# Patient Record
Sex: MALE | Race: OTHER | HISPANIC OR LATINO
[De-identification: names, ages, dates, MRNs, and addresses within clinical notes are randomized per-mention and may not be internally consistent; named-entity substitution may affect disease eponyms.]

---

## 2018-03-27 ENCOUNTER — RX RENEWAL (OUTPATIENT)
Age: 83
End: 2018-03-27

## 2018-08-28 ENCOUNTER — INPATIENT (INPATIENT)
Facility: HOSPITAL | Age: 83
LOS: 3 days | Discharge: HOME | DRG: 683 | End: 2018-09-01
Attending: FAMILY MEDICINE | Admitting: FAMILY MEDICINE
Payer: MEDICARE

## 2018-08-28 VITALS
SYSTOLIC BLOOD PRESSURE: 100 MMHG | HEART RATE: 101 BPM | OXYGEN SATURATION: 97 % | HEIGHT: 64 IN | RESPIRATION RATE: 16 BRPM | WEIGHT: 149.91 LBS | TEMPERATURE: 99 F | DIASTOLIC BLOOD PRESSURE: 65 MMHG

## 2018-08-28 DIAGNOSIS — M62.82 RHABDOMYOLYSIS: ICD-10-CM

## 2018-08-28 DIAGNOSIS — T79.6XXA TRAUMATIC ISCHEMIA OF MUSCLE, INITIAL ENCOUNTER: ICD-10-CM

## 2018-08-28 DIAGNOSIS — J18.9 PNEUMONIA, UNSPECIFIED ORGANISM: ICD-10-CM

## 2018-08-28 DIAGNOSIS — N17.9 ACUTE KIDNEY FAILURE, UNSPECIFIED: ICD-10-CM

## 2018-08-28 DIAGNOSIS — R26.2 DIFFICULTY IN WALKING, NOT ELSEWHERE CLASSIFIED: ICD-10-CM

## 2018-08-28 DIAGNOSIS — Z29.9 ENCOUNTER FOR PROPHYLACTIC MEASURES, UNSPECIFIED: ICD-10-CM

## 2018-08-28 DIAGNOSIS — F03.90 UNSPECIFIED DEMENTIA WITHOUT BEHAVIORAL DISTURBANCE: ICD-10-CM

## 2018-08-28 DIAGNOSIS — N39.0 URINARY TRACT INFECTION, SITE NOT SPECIFIED: ICD-10-CM

## 2018-08-28 LAB
ALBUMIN SERPL ELPH-MCNC: 2.9 G/DL — LOW (ref 3.3–5)
ALP SERPL-CCNC: 76 U/L — SIGNIFICANT CHANGE UP (ref 40–120)
ALT FLD-CCNC: 22 U/L — SIGNIFICANT CHANGE UP (ref 12–78)
ANION GAP SERPL CALC-SCNC: 8 MMOL/L — SIGNIFICANT CHANGE UP (ref 5–17)
APPEARANCE UR: ABNORMAL
APTT BLD: 31.1 SEC — SIGNIFICANT CHANGE UP (ref 27.5–37.4)
AST SERPL-CCNC: 30 U/L — SIGNIFICANT CHANGE UP (ref 15–37)
BACTERIA # UR AUTO: ABNORMAL
BASOPHILS # BLD AUTO: 0.02 K/UL — SIGNIFICANT CHANGE UP (ref 0–0.2)
BASOPHILS NFR BLD AUTO: 0.2 % — SIGNIFICANT CHANGE UP (ref 0–2)
BILIRUB SERPL-MCNC: 0.9 MG/DL — SIGNIFICANT CHANGE UP (ref 0.2–1.2)
BILIRUB UR-MCNC: ABNORMAL
BUN SERPL-MCNC: 37 MG/DL — HIGH (ref 7–23)
CALCIUM SERPL-MCNC: 8.6 MG/DL — SIGNIFICANT CHANGE UP (ref 8.5–10.1)
CHLORIDE SERPL-SCNC: 99 MMOL/L — SIGNIFICANT CHANGE UP (ref 96–108)
CK MB BLD-MCNC: 0.4 % — SIGNIFICANT CHANGE UP (ref 0–3.5)
CK MB CFR SERPL CALC: 2.5 NG/ML — SIGNIFICANT CHANGE UP (ref 0–3.6)
CK SERPL-CCNC: 616 U/L — HIGH (ref 26–308)
CO2 SERPL-SCNC: 27 MMOL/L — SIGNIFICANT CHANGE UP (ref 22–31)
COLOR SPEC: ABNORMAL
CREAT SERPL-MCNC: 2.1 MG/DL — HIGH (ref 0.5–1.3)
DIFF PNL FLD: ABNORMAL
EOSINOPHIL # BLD AUTO: 0.03 K/UL — SIGNIFICANT CHANGE UP (ref 0–0.5)
EOSINOPHIL NFR BLD AUTO: 0.3 % — SIGNIFICANT CHANGE UP (ref 0–6)
EPI CELLS # UR: SIGNIFICANT CHANGE UP
GLUCOSE SERPL-MCNC: 126 MG/DL — HIGH (ref 70–99)
GLUCOSE UR QL: NEGATIVE — SIGNIFICANT CHANGE UP
HCT VFR BLD CALC: 35.6 % — LOW (ref 39–50)
HGB BLD-MCNC: 12 G/DL — LOW (ref 13–17)
IMM GRANULOCYTES NFR BLD AUTO: 0.5 % — SIGNIFICANT CHANGE UP (ref 0–1.5)
INR BLD: 1.3 RATIO — HIGH (ref 0.88–1.16)
KETONES UR-MCNC: ABNORMAL
LACTATE SERPL-SCNC: 1.6 MMOL/L — SIGNIFICANT CHANGE UP (ref 0.7–2)
LEUKOCYTE ESTERASE UR-ACNC: ABNORMAL
LIDOCAIN IGE QN: 71 U/L — LOW (ref 73–393)
LYMPHOCYTES # BLD AUTO: 1.59 K/UL — SIGNIFICANT CHANGE UP (ref 1–3.3)
LYMPHOCYTES # BLD AUTO: 14.4 % — SIGNIFICANT CHANGE UP (ref 13–44)
MCHC RBC-ENTMCNC: 29.2 PG — SIGNIFICANT CHANGE UP (ref 27–34)
MCHC RBC-ENTMCNC: 33.7 GM/DL — SIGNIFICANT CHANGE UP (ref 32–36)
MCV RBC AUTO: 86.6 FL — SIGNIFICANT CHANGE UP (ref 80–100)
MONOCYTES # BLD AUTO: 0.8 K/UL — SIGNIFICANT CHANGE UP (ref 0–0.9)
MONOCYTES NFR BLD AUTO: 7.2 % — SIGNIFICANT CHANGE UP (ref 2–14)
NEUTROPHILS # BLD AUTO: 8.58 K/UL — HIGH (ref 1.8–7.4)
NEUTROPHILS NFR BLD AUTO: 77.4 % — HIGH (ref 43–77)
NITRITE UR-MCNC: NEGATIVE — SIGNIFICANT CHANGE UP
PH UR: 8 — SIGNIFICANT CHANGE UP (ref 5–8)
PLATELET # BLD AUTO: 242 K/UL — SIGNIFICANT CHANGE UP (ref 150–400)
POTASSIUM SERPL-MCNC: 3.9 MMOL/L — SIGNIFICANT CHANGE UP (ref 3.5–5.3)
POTASSIUM SERPL-SCNC: 3.9 MMOL/L — SIGNIFICANT CHANGE UP (ref 3.5–5.3)
PROT SERPL-MCNC: 8.2 G/DL — SIGNIFICANT CHANGE UP (ref 6–8.3)
PROT UR-MCNC: 75 MG/DL
PROTHROM AB SERPL-ACNC: 14.2 SEC — HIGH (ref 9.8–12.7)
RBC # BLD: 4.11 M/UL — LOW (ref 4.2–5.8)
RBC # FLD: 14.3 % — SIGNIFICANT CHANGE UP (ref 10.3–14.5)
RBC CASTS # UR COMP ASSIST: ABNORMAL /HPF (ref 0–4)
SODIUM SERPL-SCNC: 134 MMOL/L — LOW (ref 135–145)
SP GR SPEC: 1.01 — SIGNIFICANT CHANGE UP (ref 1.01–1.02)
TROPONIN I SERPL-MCNC: <.015 NG/ML — SIGNIFICANT CHANGE UP (ref 0.01–0.04)
UROBILINOGEN FLD QL: 4
WBC # BLD: 11.08 K/UL — HIGH (ref 3.8–10.5)
WBC # FLD AUTO: 11.08 K/UL — HIGH (ref 3.8–10.5)
WBC UR QL: >50

## 2018-08-28 PROCEDURE — 72125 CT NECK SPINE W/O DYE: CPT | Mod: 26

## 2018-08-28 PROCEDURE — 99223 1ST HOSP IP/OBS HIGH 75: CPT | Mod: GC,AI

## 2018-08-28 PROCEDURE — 73562 X-RAY EXAM OF KNEE 3: CPT | Mod: 26,RT

## 2018-08-28 PROCEDURE — 70450 CT HEAD/BRAIN W/O DYE: CPT | Mod: 26

## 2018-08-28 PROCEDURE — 71250 CT THORAX DX C-: CPT | Mod: 26

## 2018-08-28 PROCEDURE — 99285 EMERGENCY DEPT VISIT HI MDM: CPT

## 2018-08-28 PROCEDURE — 93010 ELECTROCARDIOGRAM REPORT: CPT

## 2018-08-28 RX ORDER — SODIUM CHLORIDE 9 MG/ML
1000 INJECTION INTRAMUSCULAR; INTRAVENOUS; SUBCUTANEOUS
Qty: 0 | Refills: 0 | Status: DISCONTINUED | OUTPATIENT
Start: 2018-08-28 | End: 2018-08-29

## 2018-08-28 RX ORDER — SODIUM CHLORIDE 9 MG/ML
3 INJECTION INTRAMUSCULAR; INTRAVENOUS; SUBCUTANEOUS ONCE
Qty: 0 | Refills: 0 | Status: COMPLETED | OUTPATIENT
Start: 2018-08-28 | End: 2018-08-28

## 2018-08-28 RX ORDER — SODIUM CHLORIDE 9 MG/ML
1000 INJECTION INTRAMUSCULAR; INTRAVENOUS; SUBCUTANEOUS ONCE
Qty: 0 | Refills: 0 | Status: COMPLETED | OUTPATIENT
Start: 2018-08-28 | End: 2018-08-28

## 2018-08-28 RX ORDER — HEPARIN SODIUM 5000 [USP'U]/ML
5000 INJECTION INTRAVENOUS; SUBCUTANEOUS EVERY 12 HOURS
Qty: 0 | Refills: 0 | Status: DISCONTINUED | OUTPATIENT
Start: 2018-08-28 | End: 2018-09-01

## 2018-08-28 RX ORDER — SODIUM CHLORIDE 9 MG/ML
1000 INJECTION INTRAMUSCULAR; INTRAVENOUS; SUBCUTANEOUS
Qty: 0 | Refills: 0 | Status: DISCONTINUED | OUTPATIENT
Start: 2018-08-28 | End: 2018-08-28

## 2018-08-28 RX ORDER — CEFTRIAXONE 500 MG/1
1 INJECTION, POWDER, FOR SOLUTION INTRAMUSCULAR; INTRAVENOUS ONCE
Qty: 0 | Refills: 0 | Status: COMPLETED | OUTPATIENT
Start: 2018-08-28 | End: 2018-08-28

## 2018-08-28 RX ORDER — CEFTRIAXONE 500 MG/1
1 INJECTION, POWDER, FOR SOLUTION INTRAMUSCULAR; INTRAVENOUS EVERY 24 HOURS
Qty: 0 | Refills: 0 | Status: DISCONTINUED | OUTPATIENT
Start: 2018-08-28 | End: 2018-08-31

## 2018-08-28 RX ADMIN — SODIUM CHLORIDE 1000 MILLILITER(S): 9 INJECTION INTRAMUSCULAR; INTRAVENOUS; SUBCUTANEOUS at 14:30

## 2018-08-28 RX ADMIN — CEFTRIAXONE 100 GRAM(S): 500 INJECTION, POWDER, FOR SOLUTION INTRAMUSCULAR; INTRAVENOUS at 15:35

## 2018-08-28 RX ADMIN — HEPARIN SODIUM 5000 UNIT(S): 5000 INJECTION INTRAVENOUS; SUBCUTANEOUS at 18:13

## 2018-08-28 RX ADMIN — CEFTRIAXONE 1 GRAM(S): 500 INJECTION, POWDER, FOR SOLUTION INTRAMUSCULAR; INTRAVENOUS at 16:05

## 2018-08-28 RX ADMIN — SODIUM CHLORIDE 1000 MILLILITER(S): 9 INJECTION INTRAMUSCULAR; INTRAVENOUS; SUBCUTANEOUS at 15:20

## 2018-08-28 RX ADMIN — SODIUM CHLORIDE 125 MILLILITER(S): 9 INJECTION INTRAMUSCULAR; INTRAVENOUS; SUBCUTANEOUS at 19:15

## 2018-08-28 RX ADMIN — SODIUM CHLORIDE 3 MILLILITER(S): 9 INJECTION INTRAMUSCULAR; INTRAVENOUS; SUBCUTANEOUS at 14:10

## 2018-08-28 NOTE — H&P ADULT - PROBLEM SELECTOR PLAN 2
-infiltrate vs scarring in anterior medial L upper lobe -leukocytosis likely 2/2 UTI  -continue with ceftriaxone IV  -f/u urine Cx

## 2018-08-28 NOTE — H&P ADULT - PROBLEM SELECTOR PLAN 7
-heparin 5000 units sub Q q12hrs   IMPROVE VTE Individual Risk Assessment  RISK                                                                Points  [  ] Previous VTE                                                  3  [  ] Thrombophilia                                               2  [  ] Lower limb paralysis                                      2        (unable to hold up >15 seconds)    [  ] Current Cancer                                              2         (within 6 months)  [  ] Immobilization > 24 hrs                                1  [  ] ICU/CCU stay > 24 hours                              1  [  x] Age > 60                                                      1  IMPROVE VTE Score 1

## 2018-08-28 NOTE — H&P ADULT - ASSESSMENT
89 y/o M PMHX of dementia admitted with 89 y/o M PMHX of dementia admitted with UTI and possible PNA. 89 y/o M PMHX of dementia admitted with difficulty ambulating found to have UTI, possible PNA and rhabdomyolysis.

## 2018-08-28 NOTE — ED ADULT NURSE NOTE - NSIMPLEMENTINTERV_GEN_ALL_ED
Implemented All Fall with Harm Risk Interventions:  [place] to call system. Call bell, personal items and telephone within reach. Instruct patient to call for assistance. Room bathroom lighting operational. Non-slip footwear when patient is off stretcher. Physically safe environment: no spills, clutter or unnecessary equipment. Stretcher in lowest position, wheels locked, appropriate side rails in place. Provide visual cue, wrist band, yellow gown, etc. Monitor gait and stability. Monitor for mental status changes and reorient to person, place, and time. Review medications for side effects contributing to fall risk. Reinforce activity limits and safety measures with patient and family. Provide visual clues: red socks.

## 2018-08-28 NOTE — ED PROVIDER NOTE - OBJECTIVE STATEMENT
PT IS A 89YO MALE WITH UNKNOWN MEDICAL HISTORY BIB FAMILY S/P FALL.   PMD:  NONE PT IS A 89YO MALE WITH UNKNOWN MEDICAL HISTORY BIB FAMILY S/P FALL. pt family reports pt fell last pm, no one at home, lives alone. unsure why pt fell. pt was on the floor for 10 hours.   PMD:  NONE

## 2018-08-28 NOTE — H&P ADULT - HISTORY OF PRESENT ILLNESS
89 y/o M PMHX Dementia     In the ED, VS: T 98.9, , /65, RR 16, sat 97% RA, WBC 11.08, H/H 12.0/35/6. plat 242, PT 14.2, INR 1.30, PTT 31.1, Na 134, BUn 37, Cr 2.10, Glu 126, alb 2.9, Lipase 71, , CKMB 2.5, CPK, 0.4, Trops neg, UA > 50 wbc, bacteria tntc, turbid, josselin, pro 75, small ketone, mod blood, small bili, 4 urobilinogen, mod leuk, CT chest Infiltrate versus scar anterior medial left upper lobe Bullous changes No evidence of acute traumatic injury. Ct head No bleed or displaced fracture. Involutional chronic microangiopathic changes. Partially empty sella. Ct C-spine Chronic multilevel degenerative cervical spondylosis without acute fracture; multilevel canal and foraminal narrowing as described above. Received ceftriaxone and 1 NS bolus. 89 y/o M PMHX Dementia, right eye blindness presents after unwitnessed fall. History obtained from daughter as patient has dementia at baseline. Patient lives alone and daughter has a camera installed in the house and did not see movement for many hours. When the family went to check on him they broke in the door and found him laying next to his bed. He was conscious at the time and the family was able to lift him off the floor and transport him back to his daughter's house. The family estimates that he was on the floor for at least 10 hours. Once back at his daughter's home the patient was extremely weak and lethargic so he was brought to the ED. The family reports a similar episode 6 months ago where the patient had a witnessed fall at home and could not get himself up, his wife also could not get him up so he was on the floor all night.  The patient has not seen a doctor in years.     In the ED, VS: T 98.9, , /65, RR 16, sat 97% RA, WBC 11.08, H/H 12.0/35/6. plat 242, PT 14.2, INR 1.30, PTT 31.1, Na 134, BUn 37, Cr 2.10, Glu 126, alb 2.9, Lipase 71, , CKMB 2.5, CPK, 0.4, Trops neg, UA > 50 wbc, bacteria tntc, turbid, josselin, pro 75, small ketone, mod blood, small bili, 4 urobilinogen, mod leuk, CT chest Infiltrate versus scar anterior medial left upper lobe Bullous changes No evidence of acute traumatic injury. Ct head No bleed or displaced fracture. Involutional chronic microangiopathic changes. Partially empty sella. Ct C-spine Chronic multilevel degenerative cervical spondylosis without acute fracture; multilevel canal and foraminal narrowing as described above. EKG sinus rhythm with PACs. Received ceftriaxone and 1 NS bolus.

## 2018-08-28 NOTE — H&P ADULT - PROBLEM SELECTOR PLAN 4
IMPROVE VTE Individual Risk Assessment  RISK                                                                Points  [  ] Previous VTE                                                  3  [  ] Thrombophilia                                               2  [  ] Lower limb paralysis                                      2        (unable to hold up >15 seconds)    [  ] Current Cancer                                              2         (within 6 months)  [  ] Immobilization > 24 hrs                                1  [  ] ICU/CCU stay > 24 hours                              1  [  x] Age > 60                                                      1  IMPROVE VTE Score 1 -likely 2/2 rhabdo and dehydration  -IVF 75ml/hr  -trend BMP - ZULEIKA on ?CKD , unknown baseline  -likely 2/2 rhabdo and dehydration  - cc/hr  -trend BMP

## 2018-08-28 NOTE — H&P ADULT - NSHPSOCIALHISTORY_GEN_ALL_CORE
per daughter patient is non smoker, does not drink. Splits time living with his daughter and by himself. Walks slowly without assistance at home.

## 2018-08-28 NOTE — H&P ADULT - NSHPPHYSICALEXAM_GEN_ALL_CORE
Vital Signs Last 24 Hrs  T(C): 37.1 (28 Aug 2018 15:50), Max: 37.2 (28 Aug 2018 13:34)  T(F): 98.7 (28 Aug 2018 15:50), Max: 98.9 (28 Aug 2018 13:34)  HR: 72 (28 Aug 2018 15:50) (72 - 101)  BP: 115/74 (28 Aug 2018 15:50) (100/65 - 115/74)  RR: 15 (28 Aug 2018 15:50) (15 - 16)  SpO2: 96% (28 Aug 2018 15:50) (96% - 97%)  Physical Exam:  General: Well developed, well nourished, NAD  HEENT: NCAT, PERRLA, EOMI bl, moist mucous membranes   Neck: Supple, nontender, no mass  Neurology: Awake, nonfocal, CN II-XII grossly intact, sensation intact, no gait abnormalities   Respiratory: CTA B/L, No W/R/R  CV: RRR, +S1/S2, no murmurs, rubs or gallops  Abdominal: Soft, NT, ND +BSx4  Extremities: No C/C/E, + peripheral pulses  MSK: Normal ROM, no joint erythema or warmth, no joint swelling   Skin: warm, dry, normal color, no rash or abnormal lesions Vital Signs Last 24 Hrs  T(C): 37.1 (28 Aug 2018 15:50), Max: 37.2 (28 Aug 2018 13:34)  T(F): 98.7 (28 Aug 2018 15:50), Max: 98.9 (28 Aug 2018 13:34)  HR: 72 (28 Aug 2018 15:50) (72 - 101)  BP: 115/74 (28 Aug 2018 15:50) (100/65 - 115/74)  RR: 15 (28 Aug 2018 15:50) (15 - 16)  SpO2: 96% (28 Aug 2018 15:50) (96% - 97%)  Physical Exam:  General: Well developed, well nourished, NAD  HEENT: EOMI bl, moist mucous membranes   Neck: Supple, nontender, no mass  Neurology: Awake, nonfocal, CN II-XII grossly intact, sensation intact  Respiratory: CTA B/L, No W/R/R  CV: RRR, +S1/S2, no murmurs, rubs or gallops  Abdominal: Soft, NT, ND +BSx4  Extremities: No C/C/E, + peripheral pulses  MSK: Normal ROM, no joint erythema or warmth, no joint swelling   Skin: warm, dry, normal color, no rash or abnormal lesions, + ecchymosis left shin, + ecchymosis left mid back Vital Signs Last 24 Hrs  T(C): 37.1 (28 Aug 2018 15:50), Max: 37.2 (28 Aug 2018 13:34)  T(F): 98.7 (28 Aug 2018 15:50), Max: 98.9 (28 Aug 2018 13:34)  HR: 72 (28 Aug 2018 15:50) (72 - 101)  BP: 115/74 (28 Aug 2018 15:50) (100/65 - 115/74)  RR: 15 (28 Aug 2018 15:50) (15 - 16)  SpO2: 96% (28 Aug 2018 15:50) (96% - 97%)  Physical Exam:  General: Well developed, well nourished, NAD  HEENT: EOMI bl, moist mucous membranes   Neck: Supple, nontender, no mass  Neurology: AAOx1-2 knows he is in [place] but does not know year or president, CN II-XII grossly intact  Respiratory: CTA B/L, No W/R/R  CV: RRR, +S1/S2, no murmurs, rubs or gallops  Abdominal: Soft, NT, ND +BSx4  Extremities: No C/C/E, + peripheral pulses  MSK: Normal ROM, no joint erythema or warmth, no joint swelling, no joint pain. No point tenderness over ecchymotic areas  Skin: warm, dry, normal color, no rash or abnormal lesions, + ecchymosis left shin, + ecchymosis left mid back Vital Signs Last 24 Hrs  T(C): 37.1 (28 Aug 2018 15:50), Max: 37.2 (28 Aug 2018 13:34)  T(F): 98.7 (28 Aug 2018 15:50), Max: 98.9 (28 Aug 2018 13:34)  HR: 72 (28 Aug 2018 15:50) (72 - 101)  BP: 115/74 (28 Aug 2018 15:50) (100/65 - 115/74)  RR: 15 (28 Aug 2018 15:50) (15 - 16)  SpO2: 96% (28 Aug 2018 15:50) (96% - 97%)  Physical Exam:  General: Well developed, well nourished, NAD  HEENT: EOMI bl, moist mucous membranes   Neck: Supple, nontender, no mass  Neurology: AAOx1-2 knows he is in [place] but does not know year or president, CN II-XII grossly intact  Respiratory: CTA B/L, No W/R/R  CV: RRR, +S1/S2, no murmurs, rubs or gallops  Abdominal: Soft, NT, ND +BSx4  Extremities: No C/C/E, + peripheral pulses  MSK: significant r knee pain with movement, no joint erythema or warmth, no joint swelling. No point tenderness over ecchymotic areas  Skin: warm, dry, normal color, no rash or abnormal lesions, + ecchymosis left shin, + ecchymosis left mid back

## 2018-08-28 NOTE — H&P ADULT - PROBLEM SELECTOR PLAN 5
-infiltrate vs scarring in anterior medial L upper lobe  -hold of on treating for PNA at this time, no clinical suspicions

## 2018-08-28 NOTE — ED PROVIDER NOTE - SKIN, MLM
Skin normal color for race, warm, dry and intact. No evidence of rash. + ecchymosis left shin, + ecchymosis left midback

## 2018-08-28 NOTE — H&P ADULT - PROBLEM SELECTOR PLAN 1
-leukocytosis likely 2/2 UTI -likely 2/2 UTI  -PT eval  -fall precautions  -social work consult as patient lives alone  -CT head negative, no acute fractures on C spine -likely 2/2 UTI and fall   -PT eval  -fall precautions  -social work consult as patient lives alone  -CT head negative, no acute fractures on C spine -likely 2/2 UTI and fall   -PT eval  -fall precautions  -social work consult as patient lives alone  -CT head negative, no acute fractures on C spine  -f/u right knee X ray

## 2018-08-28 NOTE — H&P ADULT - PROBLEM SELECTOR PLAN 3
-per family patient was evaluated by a neurologist 1.5 years ago -IVF 75ml/Hr  -trend CK -IVF 125ml/Hr  -trend CK

## 2018-08-28 NOTE — H&P ADULT - NSHPLABSRESULTS_GEN_ALL_CORE
LABS:                        12.0   11.08 )-----------( 242      ( 28 Aug 2018 14:14 )             35.6         134<L>  |  99  |  37<H>  ----------------------------<  126<H>  3.9   |  27  |  2.10<H>    Ca    8.6      28 Aug 2018 14:14    TPro  8.2  /  Alb  2.9<L>  /  TBili  0.9  /  DBili  x   /  AST  30  /  ALT  22  /  AlkPhos  76      LIVER FUNCTIONS - ( 28 Aug 2018 14:14 )  Alb: 2.9 g/dL / Pro: 8.2 g/dL / ALK PHOS: 76 U/L / ALT: 22 U/L / AST: 30 U/L / GGT: x           PT/INR - ( 28 Aug 2018 14:14 )   PT: 14.2 sec;   INR: 1.30 ratio         PTT - ( 28 Aug 2018 14:14 )  PTT:31.1 sec  Urinalysis Basic - ( 28 Aug 2018 14:30 )    Color: Martha / Appearance: Turbid / S.010 / pH: x  Gluc: x / Ketone: Small  / Bili: Small / Urobili: 4   Blood: x / Protein: 75 mg/dL / Nitrite: Negative   Leuk Esterase: Moderate / RBC: 3-5 /HPF / WBC >50   Sq Epi: x / Non Sq Epi: Occasional / Bacteria: TNTC

## 2018-08-28 NOTE — ED PROVIDER NOTE - ATTENDING CONTRIBUTION TO CARE
89 yo M p/w reportedly fell at home, unclear amt of time on floor. Pt found on floor. Pt denies complaints or pain, unable to get sig hx from pt. 89 yo M p/w reportedly fell at home, ~ 10 hrs on floor. Pt found on floor. Pt denies complaints or pain, unable to get sig hx from pt.  Exam with no signs of acute trauma. Neck supple.  no signs of head trauma> nl non-focal neuro exam.  No other acute findings.  labs / CT, IVF, admit  Pt seen by cardio.

## 2018-08-28 NOTE — ED ADULT TRIAGE NOTE - CHIEF COMPLAINT QUOTE
pt fell yesterday, on floor for over 10 hours, found by family last night at 10 p.m., pt today with generalized weakness and confusion, family concerned for dehydration

## 2018-08-28 NOTE — ED ADULT NURSE NOTE - OBJECTIVE STATEMENT
Pt to ED via wheelchair brought by family after being found on the floor at home.  Per daughter, pt has dementia and is currently living at home alone in [place] (wife is in nursing home).  Daughter tried to reach patient all day yesterday, finally went to house last night and found patient on floor - estimates he may have been on floor about 10 hours.  Today, daughter decided patient is very weak and brought him in for eval.  Pt has history of dementia but able to answer simple questions and follow some commands.  Pt has bruising to back area.

## 2018-08-29 LAB
ANION GAP SERPL CALC-SCNC: 10 MMOL/L — SIGNIFICANT CHANGE UP (ref 5–17)
BUN SERPL-MCNC: 27 MG/DL — HIGH (ref 7–23)
CALCIUM SERPL-MCNC: 8.2 MG/DL — LOW (ref 8.5–10.1)
CHLORIDE SERPL-SCNC: 105 MMOL/L — SIGNIFICANT CHANGE UP (ref 96–108)
CK SERPL-CCNC: 386 U/L — HIGH (ref 26–308)
CO2 SERPL-SCNC: 23 MMOL/L — SIGNIFICANT CHANGE UP (ref 22–31)
CREAT SERPL-MCNC: 1.2 MG/DL — SIGNIFICANT CHANGE UP (ref 0.5–1.3)
GLUCOSE SERPL-MCNC: 98 MG/DL — SIGNIFICANT CHANGE UP (ref 70–99)
HCT VFR BLD CALC: 32.2 % — LOW (ref 39–50)
HGB BLD-MCNC: 10.8 G/DL — LOW (ref 13–17)
MCHC RBC-ENTMCNC: 29.3 PG — SIGNIFICANT CHANGE UP (ref 27–34)
MCHC RBC-ENTMCNC: 33.5 GM/DL — SIGNIFICANT CHANGE UP (ref 32–36)
MCV RBC AUTO: 87.5 FL — SIGNIFICANT CHANGE UP (ref 80–100)
NRBC # BLD: 0 /100 WBCS — SIGNIFICANT CHANGE UP (ref 0–0)
PLATELET # BLD AUTO: 195 K/UL — SIGNIFICANT CHANGE UP (ref 150–400)
POTASSIUM SERPL-MCNC: 3.7 MMOL/L — SIGNIFICANT CHANGE UP (ref 3.5–5.3)
POTASSIUM SERPL-SCNC: 3.7 MMOL/L — SIGNIFICANT CHANGE UP (ref 3.5–5.3)
RBC # BLD: 3.68 M/UL — LOW (ref 4.2–5.8)
RBC # FLD: 14.3 % — SIGNIFICANT CHANGE UP (ref 10.3–14.5)
SODIUM SERPL-SCNC: 138 MMOL/L — SIGNIFICANT CHANGE UP (ref 135–145)
WBC # BLD: 7.09 K/UL — SIGNIFICANT CHANGE UP (ref 3.8–10.5)
WBC # FLD AUTO: 7.09 K/UL — SIGNIFICANT CHANGE UP (ref 3.8–10.5)

## 2018-08-29 PROCEDURE — 99232 SBSQ HOSP IP/OBS MODERATE 35: CPT

## 2018-08-29 RX ORDER — SODIUM CHLORIDE 9 MG/ML
1000 INJECTION INTRAMUSCULAR; INTRAVENOUS; SUBCUTANEOUS
Qty: 0 | Refills: 0 | Status: DISCONTINUED | OUTPATIENT
Start: 2018-08-29 | End: 2018-09-01

## 2018-08-29 RX ORDER — ACETAMINOPHEN 500 MG
650 TABLET ORAL EVERY 6 HOURS
Qty: 0 | Refills: 0 | Status: DISCONTINUED | OUTPATIENT
Start: 2018-08-29 | End: 2018-09-01

## 2018-08-29 RX ADMIN — Medication 650 MILLIGRAM(S): at 04:52

## 2018-08-29 RX ADMIN — HEPARIN SODIUM 5000 UNIT(S): 5000 INJECTION INTRAVENOUS; SUBCUTANEOUS at 17:22

## 2018-08-29 RX ADMIN — HEPARIN SODIUM 5000 UNIT(S): 5000 INJECTION INTRAVENOUS; SUBCUTANEOUS at 05:56

## 2018-08-29 RX ADMIN — CEFTRIAXONE 100 GRAM(S): 500 INJECTION, POWDER, FOR SOLUTION INTRAMUSCULAR; INTRAVENOUS at 17:22

## 2018-08-29 RX ADMIN — Medication 650 MILLIGRAM(S): at 17:30

## 2018-08-29 RX ADMIN — SODIUM CHLORIDE 75 MILLILITER(S): 9 INJECTION INTRAMUSCULAR; INTRAVENOUS; SUBCUTANEOUS at 12:48

## 2018-08-29 NOTE — PROGRESS NOTE ADULT - SUBJECTIVE AND OBJECTIVE BOX
CHIEF COMPLAINT/INTERVAL HISTORY:  Pt. seen and evaluated for fall and rhabdomyolysis.  Pt. is in no distress.  Tolerating IVF and antibiotics.  Had fever of 102 this morning.      REVIEW OF SYSTEMS:  No cough, SOB, CP, or abdominal pain.     Vital Signs Last 24 Hrs  T(C): 37.2 (29 Aug 2018 06:23), Max: 38.9 (29 Aug 2018 04:39)  T(F): 98.9 (29 Aug 2018 06:23), Max: 102 (29 Aug 2018 04:39)  HR: 96 (29 Aug 2018 04:39) (72 - 101)  BP: 123/54 (29 Aug 2018 04:39) (100/65 - 150/65)  BP(mean): --  RR: 18 (29 Aug 2018 04:39) (14 - 18)  SpO2: 99% (29 Aug 2018 04:39) (95% - 99%)    PHYSICAL EXAM:  GENERAL: NAD  HEENT: EOMI, hearing normal, conjunctiva and sclera clear  Chest: CTA bilaterally, no wheezing  CV: S1S2, RRR,   GI: soft, +BS, NT/ND  Musculoskeletal: no edema  Psychiatric: affect nL, mood nL  Skin: warm and dry, ecchymosis L. mid back    LABS:                        10.8   7.09  )-----------( 195      ( 29 Aug 2018 08:30 )             32.2     08-29    138  |  105  |  27<H>  ----------------------------<  98  3.7   |  23  |  1.20    Ca    8.2<L>      29 Aug 2018 08:30    TPro  8.2  /  Alb  2.9<L>  /  TBili  0.9  /  DBili  x   /  AST  30  /  ALT  22  /  AlkPhos  76  08-28    PT/INR - ( 28 Aug 2018 14:14 )   PT: 14.2 sec;   INR: 1.30 ratio         PTT - ( 28 Aug 2018 14:14 )  PTT:31.1 sec  Urinalysis Basic - ( 28 Aug 2018 14:30 )    Color: Martha / Appearance: Turbid / S.010 / pH: x  Gluc: x / Ketone: Small  / Bili: Small / Urobili: 4   Blood: x / Protein: 75 mg/dL / Nitrite: Negative   Leuk Esterase: Moderate / RBC: 3-5 /HPF / WBC >50   Sq Epi: x / Non Sq Epi: Occasional / Bacteria: TNTC        Assessment and Plan:  -s/p fall with rhabdomyolysis:  CPK trending down.  Physical therapy evaluation.   -Fever and UTI:  continue Ceftriaxone 1gm IV daily.  Check urine cx and blood cx.    -PNA:  Pt. without clinical symptoms of pneumonia.  Will continue to monitor.   -ZULEIKA:  improved.  Will change IVF to NS@75cc/hr x 12 hours.   -Dementia:  supportive care  -VTE ppx: heparin 5000 units SQ Q12h

## 2018-08-29 NOTE — PHYSICAL THERAPY INITIAL EVALUATION ADULT - PERTINENT HX OF CURRENT PROBLEM, REHAB EVAL
Pt admitted with nausea and vomiting . Pt diagnosed with PNA and a UTI. PMH: dementia, right eye blindness

## 2018-08-29 NOTE — CHART NOTE - NSCHARTNOTEFT_GEN_A_CORE
Called by RN as patient noted to have fever of 102F. Patient with no acute changes since being transferred to , resting comfortably in bed. Patient was admitted the night prior for UTI, with workup indicative of possible PNA. Currently only being treated with Rocephin for UTI, as he did not appear to have clinical signs/symptoms of PNA. Fever appears to be new, but fever work up had essentially been performed a few hrs prior during admission, with the exception of blood cultures. Will order blood cultures now, though this may be of limited utility as patient has already received Rocephin IV. Will continue to monitor clinically for signs/symptoms of PNA, and will continue to treat his UTI with Rocephin IV. Follow up blood cultures, urine culture. Ordered Tylenol prn fever. Will continue to monitor, RN to call if any changes.

## 2018-08-29 NOTE — GOALS OF CARE CONVERSATION - PERSONAL ADVANCE DIRECTIVE - CONVERSATION DETAILS
spoke briefly to pt daughter on phone, pt has no hcp, pt wife in SNF, pt daughterSwapna is surrogate for medical decisions. pt w some confusions, dementia. daughter to visit, will discuss further when visits.  contact # given

## 2018-08-30 ENCOUNTER — TRANSCRIPTION ENCOUNTER (OUTPATIENT)
Age: 83
End: 2018-08-30

## 2018-08-30 LAB
-  AMIKACIN: SIGNIFICANT CHANGE UP
-  AMOXICILLIN/CLAVULANIC ACID: SIGNIFICANT CHANGE UP
-  AMPICILLIN/SULBACTAM: SIGNIFICANT CHANGE UP
-  AMPICILLIN: SIGNIFICANT CHANGE UP
-  AZTREONAM: SIGNIFICANT CHANGE UP
-  CEFAZOLIN: SIGNIFICANT CHANGE UP
-  CEFEPIME: SIGNIFICANT CHANGE UP
-  CEFOXITIN: SIGNIFICANT CHANGE UP
-  CEFTRIAXONE: SIGNIFICANT CHANGE UP
-  CIPROFLOXACIN: SIGNIFICANT CHANGE UP
-  ERTAPENEM: SIGNIFICANT CHANGE UP
-  GENTAMICIN: SIGNIFICANT CHANGE UP
-  LEVOFLOXACIN: SIGNIFICANT CHANGE UP
-  MEROPENEM: SIGNIFICANT CHANGE UP
-  NITROFURANTOIN: SIGNIFICANT CHANGE UP
-  PIPERACILLIN/TAZOBACTAM: SIGNIFICANT CHANGE UP
-  TOBRAMYCIN: SIGNIFICANT CHANGE UP
-  TRIMETHOPRIM/SULFAMETHOXAZOLE: SIGNIFICANT CHANGE UP
ANION GAP SERPL CALC-SCNC: 8 MMOL/L — SIGNIFICANT CHANGE UP (ref 5–17)
BUN SERPL-MCNC: 17 MG/DL — SIGNIFICANT CHANGE UP (ref 7–23)
CALCIUM SERPL-MCNC: 8.2 MG/DL — LOW (ref 8.5–10.1)
CHLORIDE SERPL-SCNC: 105 MMOL/L — SIGNIFICANT CHANGE UP (ref 96–108)
CK SERPL-CCNC: 220 U/L — SIGNIFICANT CHANGE UP (ref 26–308)
CO2 SERPL-SCNC: 24 MMOL/L — SIGNIFICANT CHANGE UP (ref 22–31)
CREAT SERPL-MCNC: 1 MG/DL — SIGNIFICANT CHANGE UP (ref 0.5–1.3)
CULTURE RESULTS: SIGNIFICANT CHANGE UP
GLUCOSE SERPL-MCNC: 90 MG/DL — SIGNIFICANT CHANGE UP (ref 70–99)
HCT VFR BLD CALC: 31.5 % — LOW (ref 39–50)
HGB BLD-MCNC: 10.5 G/DL — LOW (ref 13–17)
MAGNESIUM SERPL-MCNC: 2 MG/DL — SIGNIFICANT CHANGE UP (ref 1.6–2.6)
MCHC RBC-ENTMCNC: 29 PG — SIGNIFICANT CHANGE UP (ref 27–34)
MCHC RBC-ENTMCNC: 33.3 GM/DL — SIGNIFICANT CHANGE UP (ref 32–36)
MCV RBC AUTO: 87 FL — SIGNIFICANT CHANGE UP (ref 80–100)
METHOD TYPE: SIGNIFICANT CHANGE UP
NRBC # BLD: 0 /100 WBCS — SIGNIFICANT CHANGE UP (ref 0–0)
ORGANISM # SPEC MICROSCOPIC CNT: SIGNIFICANT CHANGE UP
ORGANISM # SPEC MICROSCOPIC CNT: SIGNIFICANT CHANGE UP
PLATELET # BLD AUTO: 207 K/UL — SIGNIFICANT CHANGE UP (ref 150–400)
POTASSIUM SERPL-MCNC: 3.7 MMOL/L — SIGNIFICANT CHANGE UP (ref 3.5–5.3)
POTASSIUM SERPL-SCNC: 3.7 MMOL/L — SIGNIFICANT CHANGE UP (ref 3.5–5.3)
RBC # BLD: 3.62 M/UL — LOW (ref 4.2–5.8)
RBC # FLD: 14 % — SIGNIFICANT CHANGE UP (ref 10.3–14.5)
SODIUM SERPL-SCNC: 137 MMOL/L — SIGNIFICANT CHANGE UP (ref 135–145)
SPECIMEN SOURCE: SIGNIFICANT CHANGE UP
WBC # BLD: 6.39 K/UL — SIGNIFICANT CHANGE UP (ref 3.8–10.5)
WBC # FLD AUTO: 6.39 K/UL — SIGNIFICANT CHANGE UP (ref 3.8–10.5)

## 2018-08-30 PROCEDURE — 99232 SBSQ HOSP IP/OBS MODERATE 35: CPT

## 2018-08-30 RX ADMIN — HEPARIN SODIUM 5000 UNIT(S): 5000 INJECTION INTRAVENOUS; SUBCUTANEOUS at 05:23

## 2018-08-30 RX ADMIN — CEFTRIAXONE 100 GRAM(S): 500 INJECTION, POWDER, FOR SOLUTION INTRAMUSCULAR; INTRAVENOUS at 16:59

## 2018-08-30 RX ADMIN — HEPARIN SODIUM 5000 UNIT(S): 5000 INJECTION INTRAVENOUS; SUBCUTANEOUS at 18:35

## 2018-08-30 NOTE — DISCHARGE NOTE ADULT - ADDITIONAL INSTRUCTIONS
follow up with your primary care physician Dr. Amico within 3-5days of discharge  pt and family responsible to setup all follow up appts  please complete all antibiotics as prescribed

## 2018-08-30 NOTE — DISCHARGE NOTE ADULT - MEDICATION SUMMARY - MEDICATIONS TO TAKE
I will START or STAY ON the medications listed below when I get home from the hospital:    ciprofloxacin 500 mg oral tablet  -- 1 tab(s) by mouth every 12 hours x 4 days.  -- Indication: For UTI (urinary tract infection) I will START or STAY ON the medications listed below when I get home from the hospital:    rolling walker  -- Use as directed  ICD 10:  R26.81  [id number]  -- Indication: For Unsteady gait    ciprofloxacin 500 mg oral tablet  -- 1 tab(s) by mouth every 12 hours x 4 days.  -- Indication: For UTI (urinary tract infection)

## 2018-08-30 NOTE — DISCHARGE NOTE ADULT - CARE PLAN
Principal Discharge DX:	Non-traumatic rhabdomyolysis  Goal:	Avoid future falls  Assessment and plan of treatment:	Follow up with PMD in 1 week, activity as tolerable, regular diet  Secondary Diagnosis:	ZULEIKA (acute kidney injury)  Goal:	maintain adequate hydration  Secondary Diagnosis:	UTI (urinary tract infection)  Goal:	complete course of antibiotics Principal Discharge DX:	Non-traumatic rhabdomyolysis  Goal:	Avoid future falls  Assessment and plan of treatment:	Follow up with PMD in 1 week, activity as tolerated with rolling walker, regular diet  Secondary Diagnosis:	ZULEIKA (acute kidney injury)  Goal:	maintain adequate hydration  Assessment and plan of treatment:	improved  Secondary Diagnosis:	UTI (urinary tract infection)  Goal:	complete course of antibiotics  Assessment and plan of treatment:	follow up with your primary medical physician within 1 week of discharge

## 2018-08-30 NOTE — DISCHARGE NOTE ADULT - HOSPITAL COURSE
89 y/o M PMHX Dementia, right eye blindness presents after unwitnessed fall. History obtained from daughter as patient has dementia at baseline. Patient lives alone and daughter has a camera installed in the house and did not see movement for many hours. When the family went to check on him they broke in the door and found him laying next to his bed. He was conscious at the time and the family was able to lift him off the floor and transport him back to his daughter's house. The family estimates that he was on the floor for at least 10 hours. Once back at his daughter's home the patient was extremely weak and lethargic so he was brought to the ED. The family reports a similar episode 6 months ago where the patient had a witnessed fall at home and could not get himself up, his wife also could not get him up so he was on the floor all night.  The patient has not seen a doctor in years.     In the ED, VS: T 98.9, , /65, RR 16, sat 97% RA, WBC 11.08, H/H 12.0/35/6. plat 242, PT 14.2, INR 1.30, PTT 31.1, Na 134, BUn 37, Cr 2.10, Glu 126, alb 2.9, Lipase 71, , CKMB 2.5, CPK, 0.4, Trops neg, UA > 50 wbc, bacteria tntc, turbid, josselin, pro 75, small ketone, mod blood, small bili, 4 urobilinogen, mod leuk, CT chest Infiltrate versus scar anterior medial left upper lobe Bullous changes No evidence of acute traumatic injury. Ct head No bleed or displaced fracture. Involutional chronic microangiopathic changes. Partially empty sella. Ct C-spine Chronic multilevel degenerative cervical spondylosis without acute fracture; multilevel canal and foraminal narrowing as described above. EKG sinus rhythm with PACs. Received ceftriaxone and 1 NS bolus.    Pt. was admitted and continued on IV antibiotics for UTI.  He was also given IV hydration for rhabdomyolysis and ZULEIKA.  His renal function improved and CPK levels trended down.  His leukocytosis resolved.  Urine Cx grew out Proteus and blood cx have been negative.  Pt. will now be transitioned to oral antibiotics.    On day of discharge patient is in no distress.  Afebrile and hemodynamically stable.      Completion of discharge in 32 minutes. 89 y/o M PMHX Dementia, right eye blindness presents after unwitnessed fall. History obtained from daughter as patient has dementia at baseline. Patient lives alone and daughter has a camera installed in the house and did not see movement for many hours. When the family went to check on him they broke in the door and found him laying next to his bed. He was conscious at the time and the family was able to lift him off the floor and transport him back to his daughter's house. The family estimates that he was on the floor for at least 10 hours. Once back at his daughter's home the patient was extremely weak and lethargic so he was brought to the ED. The family reports a similar episode 6 months ago where the patient had a witnessed fall at home and could not get himself up, his wife also could not get him up so he was on the floor all night.  The patient has not seen a doctor in years.     In the ED, VS: T 98.9, , /65, RR 16, sat 97% RA, WBC 11.08, H/H 12.0/35/6. plat 242, PT 14.2, INR 1.30, PTT 31.1, Na 134, BUn 37, Cr 2.10, Glu 126, alb 2.9, Lipase 71, , CKMB 2.5, CPK, 0.4, Trops neg, UA > 50 wbc, bacteria tntc, turbid, josselin, pro 75, small ketone, mod blood, small bili, 4 urobilinogen, mod leuk, CT chest Infiltrate versus scar anterior medial left upper lobe Bullous changes No evidence of acute traumatic injury. Ct head No bleed or displaced fracture. Involutional chronic microangiopathic changes. Partially empty sella. Ct C-spine Chronic multilevel degenerative cervical spondylosis without acute fracture; multilevel canal and foraminal narrowing as described above. EKG sinus rhythm with PACs. Received ceftriaxone and 1 NS bolus.    Pt. was admitted and continued on IV antibiotics for UTI.  He was also given IV hydration for rhabdomyolysis and ZULEIKA.  His renal function improved and CPK levels trended down.  His leukocytosis resolved.  Urine Cx grew out Proteus and blood cx have been negative.  Pt. will now be transitioned to oral antibiotics. Pt seen by Physical therapy and recommended rolling walker with ambulation with assistance. Pt is stable and improved for discharge.     Time spent: 45 minutes

## 2018-08-30 NOTE — DISCHARGE NOTE ADULT - PLAN OF CARE
Avoid future falls Follow up with PMD in 1 week, activity as tolerable, regular diet maintain adequate hydration complete course of antibiotics Follow up with PMD in 1 week, activity as tolerated with rolling walker, regular diet improved follow up with your primary medical physician within 1 week of discharge

## 2018-08-30 NOTE — DISCHARGE NOTE ADULT - PATIENT PORTAL LINK FT
You can access the [facility] Patient Portal, offered by [facility], by registering with the following website: [email or web address]

## 2018-08-30 NOTE — PROGRESS NOTE ADULT - SUBJECTIVE AND OBJECTIVE BOX
CHIEF COMPLAINT/INTERVAL HISTORY:  Pt. seen and evaluated for fever and UTI.  Pt. is in no distress.  Eating breakfast.  Tolerating IV antibiotic. Had fever 101.5 yesterday.     REVIEW OF SYSTEMS:  So far afebrile.  No CP, acute respiratory distress, or abdominal pain.     Vital Signs Last 24 Hrs  T(C): 37.8 (30 Aug 2018 05:37), Max: 38.6 (29 Aug 2018 17:29)  T(F): 100 (30 Aug 2018 05:37), Max: 101.5 (29 Aug 2018 17:29)  HR: 83 (30 Aug 2018 05:37) (64 - 83)  BP: 148/67 (30 Aug 2018 05:37) (110/50 - 148/67)  BP(mean): --  RR: 18 (30 Aug 2018 05:37) (18 - 18)  SpO2: 91% (30 Aug 2018 05:37) (91% - 97%)    PHYSICAL EXAM:  GENERAL: NAD  HEENT: EOMI, hearing normal, conjunctiva and sclera clear  Chest: CTA bilaterally, no wheezing  CV: S1S2, RRR,   GI: soft, +BS, NT/ND  Musculoskeletal: no edema  Psychiatric: affect nL, mood nL  Skin: warm and dry, mild ecchymosis of L. and R. mid back    LABS:                        10.5   6.39  )-----------( 207      ( 30 Aug 2018 07:47 )             31.5     08-30    137  |  105  |  17  ----------------------------<  90  3.7   |  24  |  1.00    Ca    8.2<L>      30 Aug 2018 07:47  Mg     2.0         TPro  8.2  /  Alb  2.9<L>  /  TBili  0.9  /  DBili  x   /  AST  30  /  ALT  22  /  AlkPhos  76  08-28    PT/INR - ( 28 Aug 2018 14:14 )   PT: 14.2 sec;   INR: 1.30 ratio         PTT - ( 28 Aug 2018 14:14 )  PTT:31.1 sec  Urinalysis Basic - ( 28 Aug 2018 14:30 )    Color: Martha / Appearance: Turbid / S.010 / pH: x  Gluc: x / Ketone: Small  / Bili: Small / Urobili: 4   Blood: x / Protein: 75 mg/dL / Nitrite: Negative   Leuk Esterase: Moderate / RBC: 3-5 /HPF / WBC >50   Sq Epi: x / Non Sq Epi: Occasional / Bacteria: TNTC        Assessment and Plan:  -s/p fall with rhabdomyolysis:  CPK trending down.  Physical therapy evaluation noted: CORIE vs HCPT.   -Fever and UTI:  Urine cx + Proteus.  Check blood cx.  Continue Ceftriaxone 1gm IV daily.   -PNA:  Pt. without clinical symptoms of pneumonia.  Will continue to monitor.   -ZULEIKA:  improved.    -Dementia:  supportive care  -VTE ppx: heparin 5000 units SQ Q12h

## 2018-08-30 NOTE — DISCHARGE NOTE ADULT - CARE PROVIDER_API CALL
Amico, Frank J (DO), Internal Medicine  [address] [place] [address]  Phone: [phone]  Fax: [phone]

## 2018-08-30 NOTE — GOALS OF CARE CONVERSATION - PERSONAL ADVANCE DIRECTIVE - CONVERSATION DETAILS
met pt w daughter, Swapna, pt has no hcp, educated role of hcp. pt w dementia, but at this time knew year & where he is.  daughter spoke to pt in Nepalese, did not want  phone to explain. inquired if wanted resuscitation w simple words and actions. pt wants resuscitation.  pt wants his daughter Swapna to speak for him, as surrogate.  pt remains full code. contact # given

## 2018-08-31 PROCEDURE — 99232 SBSQ HOSP IP/OBS MODERATE 35: CPT

## 2018-08-31 RX ORDER — CIPROFLOXACIN LACTATE 400MG/40ML
1 VIAL (ML) INTRAVENOUS
Qty: 8 | Refills: 0
Start: 2018-08-31

## 2018-08-31 RX ORDER — CIPROFLOXACIN LACTATE 400MG/40ML
1 VIAL (ML) INTRAVENOUS
Qty: 0 | Refills: 0 | COMMUNITY
Start: 2018-08-31

## 2018-08-31 RX ORDER — CIPROFLOXACIN LACTATE 400MG/40ML
500 VIAL (ML) INTRAVENOUS EVERY 12 HOURS
Qty: 0 | Refills: 0 | Status: DISCONTINUED | OUTPATIENT
Start: 2018-08-31 | End: 2018-09-01

## 2018-08-31 RX ADMIN — HEPARIN SODIUM 5000 UNIT(S): 5000 INJECTION INTRAVENOUS; SUBCUTANEOUS at 17:59

## 2018-08-31 RX ADMIN — Medication 500 MILLIGRAM(S): at 17:59

## 2018-08-31 RX ADMIN — HEPARIN SODIUM 5000 UNIT(S): 5000 INJECTION INTRAVENOUS; SUBCUTANEOUS at 06:20

## 2018-08-31 NOTE — PROGRESS NOTE ADULT - SUBJECTIVE AND OBJECTIVE BOX
CHIEF COMPLAINT/INTERVAL HISTORY:  Pt. seen and evaluated for fall and rhabdomyolysis.  Pt. is in no distress.  Tolerating antibiotics.    REVIEW OF SYSTEMS:  Afebrile.  No CP, SOB, or abdominal pain.     Vital Signs Last 24 Hrs  T(C): 36.9 (31 Aug 2018 05:18), Max: 37.2 (30 Aug 2018 13:42)  T(F): 98.4 (31 Aug 2018 05:18), Max: 99 (30 Aug 2018 13:42)  HR: 68 (31 Aug 2018 05:18) (68 - 93)  BP: 161/76 (31 Aug 2018 05:18) (126/67 - 161/76)  BP(mean): --  RR: 17 (31 Aug 2018 05:18) (17 - 18)  SpO2: 97% (31 Aug 2018 05:18) (92% - 97%)    PHYSICAL EXAM:  GENERAL: NAD  HEENT: EOMI, hearing normal, conjunctiva and sclera clear  Chest: CTA bilaterally, no wheezing  CV: S1S2, RRR,   GI: soft, +BS, NT/ND  Musculoskeletal: no edema  Psychiatric: affect nL, mood nL  Skin: warm and dry, mild ecchymosis of L. and R. mid back    LABS:                        10.5   6.39  )-----------( 207      ( 30 Aug 2018 07:47 )             31.5     08-30    137  |  105  |  17  ----------------------------<  90  3.7   |  24  |  1.00    Ca    8.2<L>      30 Aug 2018 07:47  Mg     2.0     08-30      Assessment and Plan:  -s/p fall with rhabdomyolysis:  CPK trending down.  Physical therapy evaluation noted: CORIE vs HCPT.   -Fever and UTI:  Urine cx + Proteus.  Blood Cx NGTD.  Switch antibiotics to Cipro 500mg PO BID.  -PNA:  Pt. without clinical symptoms of pneumonia.  Will continue to monitor.   -ZULEIKA:  improved.    -Dementia:  supportive care  -VTE ppx: heparin 5000 units SQ Q12h

## 2018-09-01 VITALS
HEART RATE: 55 BPM | RESPIRATION RATE: 18 BRPM | TEMPERATURE: 98 F | OXYGEN SATURATION: 96 % | SYSTOLIC BLOOD PRESSURE: 105 MMHG | DIASTOLIC BLOOD PRESSURE: 59 MMHG

## 2018-09-01 PROCEDURE — 97162 PT EVAL MOD COMPLEX 30 MIN: CPT

## 2018-09-01 PROCEDURE — 85610 PROTHROMBIN TIME: CPT

## 2018-09-01 PROCEDURE — 83690 ASSAY OF LIPASE: CPT

## 2018-09-01 PROCEDURE — 99239 HOSP IP/OBS DSCHRG MGMT >30: CPT

## 2018-09-01 PROCEDURE — 80048 BASIC METABOLIC PNL TOTAL CA: CPT

## 2018-09-01 PROCEDURE — 71250 CT THORAX DX C-: CPT

## 2018-09-01 PROCEDURE — 84145 PROCALCITONIN (PCT): CPT

## 2018-09-01 PROCEDURE — 83605 ASSAY OF LACTIC ACID: CPT

## 2018-09-01 PROCEDURE — 80053 COMPREHEN METABOLIC PANEL: CPT

## 2018-09-01 PROCEDURE — 85730 THROMBOPLASTIN TIME PARTIAL: CPT

## 2018-09-01 PROCEDURE — 82550 ASSAY OF CK (CPK): CPT

## 2018-09-01 PROCEDURE — 73562 X-RAY EXAM OF KNEE 3: CPT

## 2018-09-01 PROCEDURE — 96365 THER/PROPH/DIAG IV INF INIT: CPT

## 2018-09-01 PROCEDURE — 82553 CREATINE MB FRACTION: CPT

## 2018-09-01 PROCEDURE — 97116 GAIT TRAINING THERAPY: CPT

## 2018-09-01 PROCEDURE — 97530 THERAPEUTIC ACTIVITIES: CPT

## 2018-09-01 PROCEDURE — 87186 SC STD MICRODIL/AGAR DIL: CPT

## 2018-09-01 PROCEDURE — 81001 URINALYSIS AUTO W/SCOPE: CPT

## 2018-09-01 PROCEDURE — 85027 COMPLETE CBC AUTOMATED: CPT

## 2018-09-01 PROCEDURE — 84484 ASSAY OF TROPONIN QUANT: CPT

## 2018-09-01 PROCEDURE — 87040 BLOOD CULTURE FOR BACTERIA: CPT

## 2018-09-01 PROCEDURE — 83735 ASSAY OF MAGNESIUM: CPT

## 2018-09-01 PROCEDURE — 72125 CT NECK SPINE W/O DYE: CPT

## 2018-09-01 PROCEDURE — 93005 ELECTROCARDIOGRAM TRACING: CPT

## 2018-09-01 PROCEDURE — 87086 URINE CULTURE/COLONY COUNT: CPT

## 2018-09-01 PROCEDURE — 99285 EMERGENCY DEPT VISIT HI MDM: CPT | Mod: 25

## 2018-09-01 PROCEDURE — 70450 CT HEAD/BRAIN W/O DYE: CPT

## 2018-09-01 RX ADMIN — HEPARIN SODIUM 5000 UNIT(S): 5000 INJECTION INTRAVENOUS; SUBCUTANEOUS at 05:30

## 2018-09-01 RX ADMIN — Medication 500 MILLIGRAM(S): at 05:30

## 2018-09-03 LAB
CULTURE RESULTS: SIGNIFICANT CHANGE UP
CULTURE RESULTS: SIGNIFICANT CHANGE UP
SPECIMEN SOURCE: SIGNIFICANT CHANGE UP
SPECIMEN SOURCE: SIGNIFICANT CHANGE UP

## 2018-09-13 PROBLEM — H54.40 BLINDNESS, ONE EYE, UNSPECIFIED EYE: Chronic | Status: ACTIVE | Noted: 2018-08-28

## 2018-09-13 PROBLEM — F03.90 UNSPECIFIED DEMENTIA WITHOUT BEHAVIORAL DISTURBANCE: Chronic | Status: ACTIVE | Noted: 2018-08-28

## 2018-10-05 ENCOUNTER — APPOINTMENT (OUTPATIENT)
Dept: INTERNAL MEDICINE | Facility: CLINIC | Age: 83
End: 2018-10-05
Payer: MEDICARE

## 2018-10-05 ENCOUNTER — EMERGENCY (EMERGENCY)
Facility: HOSPITAL | Age: 83
LOS: 1 days | Discharge: HOME | End: 2018-10-05
Attending: EMERGENCY MEDICINE
Payer: MEDICARE

## 2018-10-05 VITALS
HEART RATE: 86 BPM | WEIGHT: 154.98 LBS | OXYGEN SATURATION: 97 % | RESPIRATION RATE: 17 BRPM | SYSTOLIC BLOOD PRESSURE: 136 MMHG | DIASTOLIC BLOOD PRESSURE: 76 MMHG | HEIGHT: 67 IN | TEMPERATURE: 98 F

## 2018-10-05 VITALS
HEART RATE: 93 BPM | SYSTOLIC BLOOD PRESSURE: 133 MMHG | BODY MASS INDEX: 23.49 KG/M2 | HEIGHT: 65 IN | WEIGHT: 141 LBS | DIASTOLIC BLOOD PRESSURE: 69 MMHG | TEMPERATURE: 99.2 F

## 2018-10-05 VITALS — TEMPERATURE: 101 F

## 2018-10-05 DIAGNOSIS — Z78.9 OTHER SPECIFIED HEALTH STATUS: ICD-10-CM

## 2018-10-05 DIAGNOSIS — J15.9 UNSPECIFIED BACTERIAL PNEUMONIA: ICD-10-CM

## 2018-10-05 DIAGNOSIS — N39.0 SEPSIS, UNSPECIFIED ORGANISM: ICD-10-CM

## 2018-10-05 DIAGNOSIS — A41.9 SEPSIS, UNSPECIFIED ORGANISM: ICD-10-CM

## 2018-10-05 DIAGNOSIS — Z87.440 PERSONAL HISTORY OF URINARY (TRACT) INFECTIONS: ICD-10-CM

## 2018-10-05 LAB
ALBUMIN SERPL ELPH-MCNC: 2.9 G/DL — LOW (ref 3.5–5)
ALP SERPL-CCNC: 120 U/L — SIGNIFICANT CHANGE UP (ref 40–120)
ALT FLD-CCNC: 35 U/L DA — SIGNIFICANT CHANGE UP (ref 10–60)
ANION GAP SERPL CALC-SCNC: 5 MMOL/L — SIGNIFICANT CHANGE UP (ref 5–17)
APPEARANCE UR: CLEAR — SIGNIFICANT CHANGE UP
APTT BLD: 33.3 SEC — SIGNIFICANT CHANGE UP (ref 27.5–37.4)
AST SERPL-CCNC: 32 U/L — SIGNIFICANT CHANGE UP (ref 10–40)
BASOPHILS # BLD AUTO: 0.1 K/UL — SIGNIFICANT CHANGE UP (ref 0–0.2)
BASOPHILS NFR BLD AUTO: 0.8 % — SIGNIFICANT CHANGE UP (ref 0–2)
BILIRUB SERPL-MCNC: 0.5 MG/DL — SIGNIFICANT CHANGE UP (ref 0.2–1.2)
BILIRUB UR-MCNC: NEGATIVE — SIGNIFICANT CHANGE UP
BUN SERPL-MCNC: 16 MG/DL — SIGNIFICANT CHANGE UP (ref 7–18)
CALCIUM SERPL-MCNC: 9.1 MG/DL — SIGNIFICANT CHANGE UP (ref 8.4–10.5)
CHLORIDE SERPL-SCNC: 101 MMOL/L — SIGNIFICANT CHANGE UP (ref 96–108)
CO2 SERPL-SCNC: 28 MMOL/L — SIGNIFICANT CHANGE UP (ref 22–31)
COLOR SPEC: YELLOW — SIGNIFICANT CHANGE UP
CREAT SERPL-MCNC: 1.11 MG/DL — SIGNIFICANT CHANGE UP (ref 0.5–1.3)
DIFF PNL FLD: NEGATIVE — SIGNIFICANT CHANGE UP
EOSINOPHIL # BLD AUTO: 0.3 K/UL — SIGNIFICANT CHANGE UP (ref 0–0.5)
EOSINOPHIL NFR BLD AUTO: 2.4 % — SIGNIFICANT CHANGE UP (ref 0–6)
GLUCOSE SERPL-MCNC: 102 MG/DL — HIGH (ref 70–99)
GLUCOSE UR QL: NEGATIVE — SIGNIFICANT CHANGE UP
HCT VFR BLD CALC: 36.7 % — LOW (ref 39–50)
HGB BLD-MCNC: 12.3 G/DL — LOW (ref 13–17)
INR BLD: 1.13 RATIO — SIGNIFICANT CHANGE UP (ref 0.88–1.16)
KETONES UR-MCNC: NEGATIVE — SIGNIFICANT CHANGE UP
LACTATE SERPL-SCNC: 1.5 MMOL/L — SIGNIFICANT CHANGE UP (ref 0.7–2)
LEUKOCYTE ESTERASE UR-ACNC: NEGATIVE — SIGNIFICANT CHANGE UP
LYMPHOCYTES # BLD AUTO: 2.9 K/UL — SIGNIFICANT CHANGE UP (ref 1–3.3)
LYMPHOCYTES # BLD AUTO: 26.1 % — SIGNIFICANT CHANGE UP (ref 13–44)
MCHC RBC-ENTMCNC: 30.3 PG — SIGNIFICANT CHANGE UP (ref 27–34)
MCHC RBC-ENTMCNC: 33.6 GM/DL — SIGNIFICANT CHANGE UP (ref 32–36)
MCV RBC AUTO: 90.3 FL — SIGNIFICANT CHANGE UP (ref 80–100)
MONOCYTES # BLD AUTO: 0.6 K/UL — SIGNIFICANT CHANGE UP (ref 0–0.9)
MONOCYTES NFR BLD AUTO: 5 % — SIGNIFICANT CHANGE UP (ref 2–14)
NEUTROPHILS # BLD AUTO: 7.3 K/UL — SIGNIFICANT CHANGE UP (ref 1.8–7.4)
NEUTROPHILS NFR BLD AUTO: 65.8 % — SIGNIFICANT CHANGE UP (ref 43–77)
NITRITE UR-MCNC: NEGATIVE — SIGNIFICANT CHANGE UP
PH UR: 6 — SIGNIFICANT CHANGE UP (ref 5–8)
PLATELET # BLD AUTO: 318 K/UL — SIGNIFICANT CHANGE UP (ref 150–400)
POTASSIUM SERPL-MCNC: 3.9 MMOL/L — SIGNIFICANT CHANGE UP (ref 3.5–5.3)
POTASSIUM SERPL-SCNC: 3.9 MMOL/L — SIGNIFICANT CHANGE UP (ref 3.5–5.3)
PROT SERPL-MCNC: 8.7 G/DL — HIGH (ref 6–8.3)
PROT UR-MCNC: 30 MG/DL
PROTHROM AB SERPL-ACNC: 12.3 SEC — SIGNIFICANT CHANGE UP (ref 9.8–12.7)
RAPID RVP RESULT: DETECTED
RBC # BLD: 4.06 M/UL — LOW (ref 4.2–5.8)
RBC # FLD: 13.6 % — SIGNIFICANT CHANGE UP (ref 10.3–14.5)
RV+EV RNA SPEC QL NAA+PROBE: DETECTED
SODIUM SERPL-SCNC: 134 MMOL/L — LOW (ref 135–145)
SP GR SPEC: 1.01 — SIGNIFICANT CHANGE UP (ref 1.01–1.02)
UROBILINOGEN FLD QL: 8
WBC # BLD: 11.2 K/UL — HIGH (ref 3.8–10.5)
WBC # FLD AUTO: 11.2 K/UL — HIGH (ref 3.8–10.5)

## 2018-10-05 PROCEDURE — 99053 MED SERV 10PM-8AM 24 HR FAC: CPT

## 2018-10-05 PROCEDURE — 71045 X-RAY EXAM CHEST 1 VIEW: CPT

## 2018-10-05 PROCEDURE — 83605 ASSAY OF LACTIC ACID: CPT

## 2018-10-05 PROCEDURE — 87581 M.PNEUMON DNA AMP PROBE: CPT

## 2018-10-05 PROCEDURE — 87798 DETECT AGENT NOS DNA AMP: CPT

## 2018-10-05 PROCEDURE — 81001 URINALYSIS AUTO W/SCOPE: CPT

## 2018-10-05 PROCEDURE — 85730 THROMBOPLASTIN TIME PARTIAL: CPT

## 2018-10-05 PROCEDURE — 94640 AIRWAY INHALATION TREATMENT: CPT

## 2018-10-05 PROCEDURE — 71045 X-RAY EXAM CHEST 1 VIEW: CPT | Mod: 26

## 2018-10-05 PROCEDURE — 85610 PROTHROMBIN TIME: CPT

## 2018-10-05 PROCEDURE — 87040 BLOOD CULTURE FOR BACTERIA: CPT

## 2018-10-05 PROCEDURE — 85027 COMPLETE CBC AUTOMATED: CPT

## 2018-10-05 PROCEDURE — 87086 URINE CULTURE/COLONY COUNT: CPT

## 2018-10-05 PROCEDURE — 99284 EMERGENCY DEPT VISIT MOD MDM: CPT | Mod: 25

## 2018-10-05 PROCEDURE — 99214 OFFICE O/P EST MOD 30 MIN: CPT

## 2018-10-05 PROCEDURE — 87633 RESP VIRUS 12-25 TARGETS: CPT

## 2018-10-05 PROCEDURE — 87486 CHLMYD PNEUM DNA AMP PROBE: CPT

## 2018-10-05 PROCEDURE — 80053 COMPREHEN METABOLIC PANEL: CPT

## 2018-10-05 RX ORDER — HYDROCORTISONE 1 %
12 CREAM (GRAM) TOPICAL
Qty: 400 | Refills: 2 | Status: DISCONTINUED | COMMUNITY
Start: 2018-03-27 | End: 2018-10-05

## 2018-10-05 RX ORDER — ACETAMINOPHEN 500 MG
975 TABLET ORAL ONCE
Qty: 0 | Refills: 0 | Status: COMPLETED | OUTPATIENT
Start: 2018-10-05 | End: 2018-10-05

## 2018-10-05 RX ORDER — ACETAMINOPHEN 500 MG
1000 TABLET ORAL ONCE
Qty: 0 | Refills: 0 | Status: DISCONTINUED | OUTPATIENT
Start: 2018-10-05 | End: 2018-10-05

## 2018-10-05 RX ORDER — IPRATROPIUM/ALBUTEROL SULFATE 18-103MCG
3 AEROSOL WITH ADAPTER (GRAM) INHALATION ONCE
Qty: 0 | Refills: 0 | Status: COMPLETED | OUTPATIENT
Start: 2018-10-05 | End: 2018-10-05

## 2018-10-05 RX ADMIN — Medication 975 MILLIGRAM(S): at 21:30

## 2018-10-05 RX ADMIN — Medication 3 MILLILITER(S): at 19:10

## 2018-10-05 NOTE — HISTORY OF PRESENT ILLNESS
[FreeTextEntry1] : cough\par fever\par Interview and discussion conducted in Angolan by Angolan speaking Physician.\par  [de-identified] : 89 years old male here for follow up after hospital discharge , for  urosepsis, found at home , on the floor, brought today by daughter Tracy  she states , patient has been coughing for two weeks, cough productive , febril, and malodorous urine; patient was living by himself

## 2018-10-05 NOTE — ED PROVIDER NOTE - MEDICAL DECISION MAKING DETAILS
Patient with non productive cough and fever. Obtain chest X-ray, labs, rectal temp and RVP to evaluate for possible pneumonia.

## 2018-10-05 NOTE — ED ADULT NURSE NOTE - DISCHARGE TEACHING
pt.remained   stable.denies  pain. afebrile. tyleno 975 mg po given as  ordered.re-eval by attending  md with  order ot d/c home.left in the  e d in stable condition.not  in distress

## 2018-10-05 NOTE — ED PROVIDER NOTE - OBJECTIVE STATEMENT
89 y/o M patient with no significant PMHx and no significant PSHx BIB EMS and family to the ED for and evaluation of URI Sx for  x5-x7 days. Patient's family reports patient is experiencing a non productive cough associated with rhinorrhea and fever. Patient's family also reports x2 episodes of fever today along with a cough without any episodes of shortness of breath and difficulty breathing. Family states patient was recently admitted to the hospital in August 2018 for UTI. No known sick contacts. Patient denies any other complaints. NKDA.

## 2018-10-05 NOTE — PLAN
[FreeTextEntry1] : patient with symptoms and physical exam findings suggesting community acquired pneumonia, due pneumonia severity index score , patient referred to Hospital for admission  for IV antibiotics and further testing; clinical decision discussed with patient's daughter

## 2018-10-05 NOTE — ED PROVIDER NOTE - PROGRESS NOTE DETAILS
RVP positive for rhino virus, appropriate for d/c home, will d/c with profilactin antibiotics for PNA.

## 2018-10-05 NOTE — PHYSICAL EXAM
[No Acute Distress] : no acute distress [Well-Appearing] : well-appearing [Normal Sclera/Conjunctiva] : normal sclera/conjunctiva [PERRL] : pupils equal round and reactive to light [EOMI] : extraocular movements intact [Normal Outer Ear/Nose] : the outer ears and nose were normal in appearance [Normal Oropharynx] : the oropharynx was normal [No Respiratory Distress] : no respiratory distress  [Normal Rate] : normal rate  [Regular Rhythm] : with a regular rhythm [Normal S1, S2] : normal S1 and S2 [No Murmur] : no murmur heard [No Edema] : there was no peripheral edema [Soft] : abdomen soft [Non Tender] : non-tender [No HSM] : no HSM [Normal Bowel Sounds] : normal bowel sounds [No Focal Deficits] : no focal deficits [de-identified] : diminishd breath sounds bilateral lung bases, rales on the left

## 2018-10-05 NOTE — ED ADULT NURSE NOTE - OBJECTIVE STATEMENT
as per family sent by PCP for evaluation of URI x 1wk. c/o  fever & non productive cough .sepsis protocol initiated.

## 2018-10-06 LAB
CULTURE RESULTS: NO GROWTH — SIGNIFICANT CHANGE UP
SPECIMEN SOURCE: SIGNIFICANT CHANGE UP

## 2018-12-19 ENCOUNTER — CHART COPY (OUTPATIENT)
Age: 83
End: 2018-12-19

## 2019-01-01 ENCOUNTER — EMERGENCY (EMERGENCY)
Facility: HOSPITAL | Age: 84
LOS: 1 days | Discharge: HOME | End: 2019-01-01
Attending: EMERGENCY MEDICINE
Payer: MEDICARE

## 2019-01-01 ENCOUNTER — APPOINTMENT (OUTPATIENT)
Dept: INTERNAL MEDICINE | Facility: CLINIC | Age: 84
End: 2019-01-01
Payer: MEDICARE

## 2019-01-01 VITALS
SYSTOLIC BLOOD PRESSURE: 127 MMHG | DIASTOLIC BLOOD PRESSURE: 68 MMHG | OXYGEN SATURATION: 96 % | RESPIRATION RATE: 18 BRPM | WEIGHT: 139.99 LBS | TEMPERATURE: 97 F | HEART RATE: 70 BPM | HEIGHT: 70 IN

## 2019-01-01 VITALS
OXYGEN SATURATION: 99 % | HEART RATE: 68 BPM | RESPIRATION RATE: 17 BRPM | TEMPERATURE: 98 F | DIASTOLIC BLOOD PRESSURE: 74 MMHG | SYSTOLIC BLOOD PRESSURE: 128 MMHG

## 2019-01-01 LAB
ALBUMIN SERPL ELPH-MCNC: 3.8 G/DL
ALP BLD-CCNC: 77 U/L
ALT SERPL-CCNC: 12 U/L
ANION GAP SERPL CALC-SCNC: 11 MMOL/L
AST SERPL-CCNC: 16 U/L
BASOPHILS # BLD AUTO: 0.05 K/UL
BASOPHILS NFR BLD AUTO: 0.6 %
BILIRUB SERPL-MCNC: 0.2 MG/DL
BUN SERPL-MCNC: 22 MG/DL
CALCIUM SERPL-MCNC: 10.2 MG/DL
CHLORIDE SERPL-SCNC: 102 MMOL/L
CO2 SERPL-SCNC: 27 MMOL/L
CREAT SERPL-MCNC: 1.58 MG/DL
EOSINOPHIL # BLD AUTO: 0.29 K/UL
EOSINOPHIL NFR BLD AUTO: 3.7 %
ESTIMATED AVERAGE GLUCOSE: 143 MG/DL
FERRITIN SERPL-MCNC: 71 NG/ML
GLUCOSE SERPL-MCNC: 105 MG/DL
HBA1C MFR BLD HPLC: 6.6 %
HCT VFR BLD CALC: 35.6 %
HGB BLD-MCNC: 10.9 G/DL
IMM GRANULOCYTES NFR BLD AUTO: 0.8 %
IRON SATN MFR SERPL: 21 %
IRON SERPL-MCNC: 54 UG/DL
LYMPHOCYTES # BLD AUTO: 2.61 K/UL
LYMPHOCYTES NFR BLD AUTO: 33.6 %
MAN DIFF?: NORMAL
MCHC RBC-ENTMCNC: 28.6 PG
MCHC RBC-ENTMCNC: 30.6 GM/DL
MCV RBC AUTO: 93.4 FL
MONOCYTES # BLD AUTO: 0.63 K/UL
MONOCYTES NFR BLD AUTO: 8.1 %
NEUTROPHILS # BLD AUTO: 4.12 K/UL
NEUTROPHILS NFR BLD AUTO: 53.2 %
PLATELET # BLD AUTO: 358 K/UL
POTASSIUM SERPL-SCNC: 6 MMOL/L
PROT SERPL-MCNC: 7.8 G/DL
RBC # BLD: 3.81 M/UL
RBC # FLD: 14 %
SODIUM SERPL-SCNC: 140 MMOL/L
TIBC SERPL-MCNC: 254 UG/DL
UIBC SERPL-MCNC: 200 UG/DL
WBC # FLD AUTO: 7.76 K/UL

## 2019-01-01 PROCEDURE — 72100 X-RAY EXAM L-S SPINE 2/3 VWS: CPT | Mod: 26

## 2019-01-01 PROCEDURE — 96372 THER/PROPH/DIAG INJ SC/IM: CPT

## 2019-01-01 PROCEDURE — 73030 X-RAY EXAM OF SHOULDER: CPT

## 2019-01-01 PROCEDURE — 72170 X-RAY EXAM OF PELVIS: CPT | Mod: 26

## 2019-01-01 PROCEDURE — 72170 X-RAY EXAM OF PELVIS: CPT

## 2019-01-01 PROCEDURE — 70450 CT HEAD/BRAIN W/O DYE: CPT

## 2019-01-01 PROCEDURE — 99284 EMERGENCY DEPT VISIT MOD MDM: CPT | Mod: 25

## 2019-01-01 PROCEDURE — 70450 CT HEAD/BRAIN W/O DYE: CPT | Mod: 26

## 2019-01-01 PROCEDURE — 72100 X-RAY EXAM L-S SPINE 2/3 VWS: CPT

## 2019-01-01 PROCEDURE — 99284 EMERGENCY DEPT VISIT MOD MDM: CPT

## 2019-01-01 PROCEDURE — 73030 X-RAY EXAM OF SHOULDER: CPT | Mod: 26,50

## 2019-01-01 RX ORDER — CYANOCOBALAMIN 1000 UG/ML
1000 INJECTION INTRAMUSCULAR; SUBCUTANEOUS
Qty: 0 | Refills: 0 | Status: COMPLETED | OUTPATIENT
Start: 2019-01-01

## 2019-01-01 RX ADMIN — CYANOCOBALAMIN 0 MCG/ML: 1000 INJECTION, SOLUTION INTRAMUSCULAR; SUBCUTANEOUS at 00:00

## 2019-01-15 ENCOUNTER — EMERGENCY (EMERGENCY)
Facility: HOSPITAL | Age: 84
LOS: 1 days | Discharge: HOME | End: 2019-01-15
Attending: EMERGENCY MEDICINE
Payer: MEDICARE

## 2019-01-15 VITALS
OXYGEN SATURATION: 96 % | HEART RATE: 82 BPM | RESPIRATION RATE: 20 BRPM | TEMPERATURE: 98 F | DIASTOLIC BLOOD PRESSURE: 70 MMHG | SYSTOLIC BLOOD PRESSURE: 113 MMHG

## 2019-01-15 PROCEDURE — 99284 EMERGENCY DEPT VISIT MOD MDM: CPT

## 2019-01-15 PROCEDURE — 72100 X-RAY EXAM L-S SPINE 2/3 VWS: CPT | Mod: 26

## 2019-01-15 PROCEDURE — 99284 EMERGENCY DEPT VISIT MOD MDM: CPT | Mod: 25

## 2019-01-15 PROCEDURE — 70450 CT HEAD/BRAIN W/O DYE: CPT | Mod: 26

## 2019-01-15 PROCEDURE — 70450 CT HEAD/BRAIN W/O DYE: CPT

## 2019-01-15 PROCEDURE — 72100 X-RAY EXAM L-S SPINE 2/3 VWS: CPT

## 2019-01-15 RX ORDER — ACETAMINOPHEN 500 MG
650 TABLET ORAL ONCE
Qty: 0 | Refills: 0 | Status: COMPLETED | OUTPATIENT
Start: 2019-01-15 | End: 2019-01-15

## 2019-01-15 RX ORDER — IBUPROFEN 200 MG
400 TABLET ORAL ONCE
Qty: 0 | Refills: 0 | Status: DISCONTINUED | OUTPATIENT
Start: 2019-01-15 | End: 2019-01-19

## 2019-01-15 RX ADMIN — Medication 650 MILLIGRAM(S): at 14:16

## 2019-01-15 NOTE — ED ADULT NURSE NOTE - OBJECTIVE STATEMENT
Patient came to the ED a/o x 3 s/p fall c/o low back pain. No LOC, denies hitting his head on the floor.

## 2019-01-15 NOTE — ED PROVIDER NOTE - NS ED ROS FT
CONSTITUTIONAL: no fever, no chills   EYES: no visual changes, no eye pain   ENMT: no nasal congestion, no throat pain  CARDIOVASCULAR: no chest pain, no edema, no palpitations   RESPIRATORY: no shortness of breath, no cough   GASTROINTESTINAL: no abdominal pain, no nausea, no vomiting, no diarrhea, no constipation   GENITOURINARY: no dysuria, no frequency  MUSCULOSKELETAL: no joint pains, no myalgias, no back pain, +buttocks pain   SKIN: no rashes  NEUROLOGICAL: no weakness, no headache, no dizziness, no slurred speech, no syncope   PSYCHIATRIC: no known mental health illness   HEME/LYMPH: no lymphadenopathy      All other ROS negative except as per HPI

## 2019-01-15 NOTE — ED ADULT NURSE NOTE - NSIMPLEMENTINTERV_GEN_ALL_ED
Implemented All Universal Safety Interventions:  [place] to call system. Call bell, personal items and telephone within reach. Instruct patient to call for assistance. Room bathroom lighting operational. Non-slip footwear when patient is off stretcher. Physically safe environment: no spills, clutter or unnecessary equipment. Stretcher in lowest position, wheels locked, appropriate side rails in place.

## 2019-01-15 NOTE — ED PROVIDER NOTE - PROGRESS NOTE DETAILS
CT head - no acute hemorrhage or infarct  LS xray - pending CT head - no acute hemorrhage or infarct  LS xray - no fx's  Pain controlled. Repeat neuro exam wnl. Will d/c with symptomatic support and PCP fu. Discussed indications for patient return to ED. Patient understood.

## 2019-01-15 NOTE — ED PROVIDER NOTE - PHYSICAL EXAMINATION
GENERAL: wells appearing, no acute distress   HEAD: atraumatic   EYES: EOMI, pink conjunctiva   ENT: moist oral mucosa   CARDIAC: RRR, no edema, distal pulses present   RESPIRATORY: lungs CTAB, no increased work of breathing   GASTROINTESTINAL: no abdominal tenderness, no rebound or guarding, bowel sounds presents  GENITOURINARY: no CVA tenderness   MUSCULOSKELETAL: no deformity, +tenderness of R buttocks; no hip tenderness   NEUROLOGICAL: AAOx3, CN's II-XII intact, strength 5/5 bilateral UE and LE, sensation intact to light touch, finger to nose intact, steady gait  SKIN: intact   PSYCHIATRIC: cooperative  HEME LYMPH: no lymphadenopathy

## 2019-01-15 NOTE — ED PROVIDER NOTE - MEDICAL DECISION MAKING DETAILS
89 y/o M presents s/p fall. Will obtain CT head, X-ray of lumbar/sacral spine, give Tylenol and reassess.

## 2019-01-15 NOTE — ED PROVIDER NOTE - OBJECTIVE STATEMENT
89 y/o M with PMHx of dementia and right eye blindness presents to the ED s/p mechanical slip and fall two days ago while trying to get out of bed. Granddaughter states he slid on the floor and now with mild R gluteal pain. Patient has been ambulatory since fall without antalgic gait. Patient denies head trauma, LOC, nausea, vomiting, weakness, syncope, anti-coagulant use, anti-platelet use or any other acute complaints. NKDA.

## 2019-04-12 ENCOUNTER — APPOINTMENT (OUTPATIENT)
Dept: INTERNAL MEDICINE | Facility: CLINIC | Age: 84
End: 2019-04-12
Payer: MEDICARE

## 2019-04-12 VITALS
OXYGEN SATURATION: 99 % | SYSTOLIC BLOOD PRESSURE: 111 MMHG | BODY MASS INDEX: 22.82 KG/M2 | WEIGHT: 137 LBS | HEIGHT: 65 IN | RESPIRATION RATE: 16 BRPM | DIASTOLIC BLOOD PRESSURE: 53 MMHG | TEMPERATURE: 96.7 F | HEART RATE: 76 BPM

## 2019-04-12 DIAGNOSIS — Z00.00 ENCOUNTER FOR GENERAL ADULT MEDICAL EXAMINATION W/OUT ABNORMAL FINDINGS: ICD-10-CM

## 2019-04-12 PROCEDURE — G0439: CPT

## 2019-04-12 NOTE — HISTORY OF PRESENT ILLNESS
[de-identified] : 89 years old male with dementia , here for follow up, for annual health examination, patient brought by son Ovi, as per son patient is stable, no complains, living at home alone but with home health aide; family members stay at night with him [FreeTextEntry1] : Interview and discussion conducted in Cymro by Cymro speaking Physician.\par follow up

## 2019-04-12 NOTE — PHYSICAL EXAM
[No Acute Distress] : no acute distress [Well Nourished] : well nourished [Well Developed] : well developed [Normal Sclera/Conjunctiva] : normal sclera/conjunctiva [Well-Appearing] : well-appearing [EOMI] : extraocular movements intact [PERRL] : pupils equal round and reactive to light [Normal Oropharynx] : the oropharynx was normal [Normal Outer Ear/Nose] : the outer ears and nose were normal in appearance [No Lymphadenopathy] : no lymphadenopathy [Supple] : supple [No JVD] : no jugular venous distention [Thyroid Normal, No Nodules] : the thyroid was normal and there were no nodules present [Clear to Auscultation] : lungs were clear to auscultation bilaterally [No Respiratory Distress] : no respiratory distress  [Normal Rate] : normal rate  [No Accessory Muscle Use] : no accessory muscle use [Normal S1, S2] : normal S1 and S2 [No Murmur] : no murmur heard [Regular Rhythm] : with a regular rhythm [No Carotid Bruits] : no carotid bruits [No Abdominal Bruit] : a ~M bruit was not heard ~T in the abdomen [No Varicosities] : no varicosities [No Edema] : there was no peripheral edema [Pedal Pulses Present] : the pedal pulses are present [No Palpable Aorta] : no palpable aorta [Soft] : abdomen soft [No Extremity Clubbing/Cyanosis] : no extremity clubbing/cyanosis [Non Tender] : non-tender [Non-distended] : non-distended [Normal Bowel Sounds] : normal bowel sounds [No HSM] : no HSM [No Masses] : no abdominal mass palpated [Normal Posterior Cervical Nodes] : no posterior cervical lymphadenopathy [Normal Anterior Cervical Nodes] : no anterior cervical lymphadenopathy [No CVA Tenderness] : no CVA  tenderness [No Joint Swelling] : no joint swelling [No Spinal Tenderness] : no spinal tenderness [Grossly Normal Strength/Tone] : grossly normal strength/tone [No Rash] : no rash [Coordination Grossly Intact] : coordination grossly intact [Normal Gait] : normal gait [Deep Tendon Reflexes (DTR)] : deep tendon reflexes were 2+ and symmetric [No Focal Deficits] : no focal deficits [Normal Insight/Judgement] : insight and judgment were intact [Normal Affect] : the affect was normal

## 2019-04-26 ENCOUNTER — APPOINTMENT (OUTPATIENT)
Dept: INTERNAL MEDICINE | Facility: CLINIC | Age: 84
End: 2019-04-26

## 2019-05-08 ENCOUNTER — APPOINTMENT (OUTPATIENT)
Dept: INTERNAL MEDICINE | Facility: CLINIC | Age: 84
End: 2019-05-08

## 2019-05-29 LAB
ALBUMIN SERPL ELPH-MCNC: 3.8 G/DL
ALP BLD-CCNC: 84 U/L
ALT SERPL-CCNC: 10 U/L
ANION GAP SERPL CALC-SCNC: 11 MMOL/L
AST SERPL-CCNC: 18 U/L
BASOPHILS # BLD AUTO: 0.05 K/UL
BASOPHILS NFR BLD AUTO: 0.5 %
BILIRUB SERPL-MCNC: 0.4 MG/DL
BUN SERPL-MCNC: 19 MG/DL
CALCIUM SERPL-MCNC: 9.8 MG/DL
CHLORIDE SERPL-SCNC: 102 MMOL/L
CHOLEST SERPL-MCNC: 144 MG/DL
CHOLEST/HDLC SERPL: 3.4 RATIO
CO2 SERPL-SCNC: 28 MMOL/L
CREAT SERPL-MCNC: 1.26 MG/DL
EOSINOPHIL # BLD AUTO: 0.27 K/UL
EOSINOPHIL NFR BLD AUTO: 2.9 %
ESTIMATED AVERAGE GLUCOSE: 131 MG/DL
GLUCOSE SERPL-MCNC: 102 MG/DL
HBA1C MFR BLD HPLC: 6.2 %
HCT VFR BLD CALC: 36.4 %
HDLC SERPL-MCNC: 43 MG/DL
HGB BLD-MCNC: 11.2 G/DL
IMM GRANULOCYTES NFR BLD AUTO: 0.4 %
LDLC SERPL CALC-MCNC: 83 MG/DL
LYMPHOCYTES # BLD AUTO: 3.42 K/UL
LYMPHOCYTES NFR BLD AUTO: 37 %
MAN DIFF?: NORMAL
MCHC RBC-ENTMCNC: 29 PG
MCHC RBC-ENTMCNC: 30.8 GM/DL
MCV RBC AUTO: 94.3 FL
MONOCYTES # BLD AUTO: 0.7 K/UL
MONOCYTES NFR BLD AUTO: 7.6 %
NEUTROPHILS # BLD AUTO: 4.76 K/UL
NEUTROPHILS NFR BLD AUTO: 51.6 %
PLATELET # BLD AUTO: 242 K/UL
POTASSIUM SERPL-SCNC: 5.4 MMOL/L
PROT SERPL-MCNC: 7.5 G/DL
PSA FREE FLD-MCNC: 20 %
PSA FREE SERPL-MCNC: 0.45 NG/ML
PSA SERPL-MCNC: 2.24 NG/ML
RBC # BLD: 3.86 M/UL
RBC # FLD: 14.8 %
SODIUM SERPL-SCNC: 140 MMOL/L
T4 FREE SERPL-MCNC: 0.9 NG/DL
TRIGL SERPL-MCNC: 92 MG/DL
TSH SERPL-ACNC: 12.4 UIU/ML
VIT B12 SERPL-MCNC: 192 PG/ML
WBC # FLD AUTO: 9.24 K/UL

## 2019-05-30 LAB
25(OH)D3 SERPL-MCNC: 30.9 NG/ML
APPEARANCE: CLEAR
BACTERIA: NEGATIVE
BILIRUBIN URINE: NEGATIVE
BLOOD URINE: NEGATIVE
COLOR: YELLOW
GLUCOSE QUALITATIVE U: NEGATIVE
HYALINE CASTS: 1 /LPF
KETONES URINE: NEGATIVE
LEUKOCYTE ESTERASE URINE: NEGATIVE
MICROSCOPIC-UA: NORMAL
NITRITE URINE: NEGATIVE
PH URINE: 6
PROTEIN URINE: NEGATIVE
RED BLOOD CELLS URINE: 2 /HPF
SPECIFIC GRAVITY URINE: 1.01
SQUAMOUS EPITHELIAL CELLS: 0 /HPF
UROBILINOGEN URINE: NORMAL
WHITE BLOOD CELLS URINE: 1 /HPF

## 2019-08-07 NOTE — ED ADULT TRIAGE NOTE - ESI TRIAGE ACUITY LEVEL, MLM
NUTRITION COMPLETE ASSESSMENT 
 
RECOMMENDATIONS:  
1. Switch to Glucerna 1.2 @ 60ml/hr + 80ml flush q4hr to help with BG control  
 - once PEG placed transition to bolus feeds - see below for recommendations 2. Continue to monitor BG with formula change - adjust lantus as needed 3. Add bowel regimen - last documented BM on 7/31 Interventions/Plan:  
Food/Nutrient Delivery:          Modify rate, concentration, composition, and schedule Assessment:  
Reason for Assessment:  [x]Reassessment Diet: NPO Tube feeds: Osmolite 1.5 @ 46ml/hr + 1 pkt prosource + 145ml flush q4hr Nutritionally Significant Medications: [x] Reviewed & Includes: cefepime, lantus (80 units), SSI, Shantel-Q, MVI + iron, keppra, prednisone, cellcept, thiamine Subjective: Pt unresponsive. Spoke with family in the room regarding BG and tube feeds. Objective: 
Pt admitted with pneumonia. PMHx: HTN, fibromyalgia, pulmonary fibrosis, Hodgkin's lymphoma. Had been Hospice at one point but since revoked. Code stroke but stroke ruled out. Remains unresponsive on and off with neuro following. Family declining LP. Poor prognosis noted. NGT placed on 8/1. Plans for PEG noted with family now in agreement. Tube feeds running at goal rate with no issues. Provides: 1104ml, 1716kcal, 85g protein, 839ml free fluid + 870ml fluid = 1709ml fluid. BG improved but still remains elevated with high dose lantus. Will switch to Glucerna 1.2 @ 60ml/hr + 80ml flush q4hr. Provides: 1440ml, 1728kcal, 86g protein, 1159ml free fluid + 480ml flush = 1639ml fluid. Meets 100% energy and protein needs Once PEG placed transition to goal bolus feeds: Glucerna 1.2, 360ml 4x/day + 60ml flush pre/post bolus. Provides same nutrition as above regimen. Of note: last BM on 7/31. Recommend adding bowel regimen. Will continue to follow for mental status/PO intake, tube feed tolerance, BG trends, weight status, plan of care. Estimated Nutrition Needs: Kcals/day: 1651 Kcals/day(9980-6165 kcal/day (MSJ x 1.2-1.3)) Protein: 80 g(80-83g/day (1-1.1g/kg)) Fluid: 1650 ml(1ml/kcal) Based On: [place] 1898 Weight Used: Actual wt Pt expected to meet estimated nutrient needs:  [x]   Yes  - with EN    []  No  
Nutrition Diagnosis:  
1. Unintended weight loss(Malnutrition) related to fair to poor po intake PTA as evidenced by severe wt loss x 1 yr; <50% meals x 5+ days 2. Inadequate protein-energy intake related to AMS as evidenced by EN with plans for PEG 3. Altered nutrition-related lab values related to steroids, EN as evidenced by hyperglycemia improved but still elevated Goals:   
 EN continuing to meet at least 90% needs in 5-7 days; BG between 80-180mg/dl Monitoring & Evaluation: - Total energy intake, Carbohydrate intake, Enteral/parenteral nutrition intake - Weight/weight change, GI, Glucose profile Previous Nutrition Goals Met: N/A Previous Recommendations:    N/A Education & Discharge Needs:  
 [x] None Identified 
 [x] Identified and addressed  
 [] Participated in care plan, discharge planning, and/or interdisciplinary rounds Cultural concerns: None Skin Integrity: []Intact  [x]Other - sacral wound Edema: []None [x]Other -trace Last BM: 7/31/2019 Food Allergies: [x]None []Other Anthropometrics:   
Weight Loss Metrics 8/7/2019 4/10/2019 2/9/2019 1/21/2019 11/15/2018 9/13/2018 9/12/2018 Today's Wt 169 lb 1.6 oz - 238 lb 8.6 oz 180 lb 180 lb 198 lb 201 lb 1 oz BMI 24.97 kg/m2 35.74 kg/m2 36.27 kg/m2 27.37 kg/m2 27.37 kg/m2 30.11 kg/m2 29.69 kg/m2 Last 3 Recorded Weights in this Encounter 08/05/19 0304 08/06/19 0827 08/07/19 0206 Weight: 74 kg (163 lb 2.3 oz) 76.7 kg (169 lb) 76.7 kg (169 lb 1.6 oz) Weight Source: Bed Height: 5' 9\" (175.3 cm), Body mass index is 24.97 kg/m². IBW : 65.8 kg (145 lb), % IBW (Calculated): 115.38 % Labs:   
Lab Results Component Value Date/Time 3 Sodium 140 08/07/2019 02:26 AM  
 Potassium 4.4 08/07/2019 02:26 AM  
 Chloride 106 08/07/2019 02:26 AM  
 CO2 29 08/07/2019 02:26 AM  
 Glucose 183 (H) 08/07/2019 02:26 AM  
 BUN 42 (H) 08/07/2019 02:26 AM  
 Creatinine 0.81 08/07/2019 02:26 AM  
 Calcium 8.8 08/07/2019 02:26 AM  
 Magnesium 2.5 (H) 08/04/2019 09:25 AM  
 Phosphorus 2.9 08/02/2019 05:02 AM  
 Albumin 2.6 (L) 07/23/2019 04:07 AM  
 
Ghada [address] [place] , Pager #[phone] or [phone]

## 2019-08-26 ENCOUNTER — APPOINTMENT (OUTPATIENT)
Dept: INTERNAL MEDICINE | Facility: CLINIC | Age: 84
End: 2019-08-26

## 2019-08-26 NOTE — REVIEW OF SYSTEMS
Comment: Patient previously deferred bx on midline mid chest, no lesion noted today - Clear Detail Level: Simple [Fever] : fever [Cough] : cough [Negative] : Integumentary [FreeTextEntry8] : malodorous urine [de-identified] : dementia

## 2019-11-28 ENCOUNTER — TRANSCRIPTION ENCOUNTER (OUTPATIENT)
Age: 84
End: 2019-11-28

## 2019-11-30 ENCOUNTER — TRANSCRIPTION ENCOUNTER (OUTPATIENT)
Age: 84
End: 2019-11-30

## 2019-12-02 ENCOUNTER — TRANSCRIPTION ENCOUNTER (OUTPATIENT)
Age: 84
End: 2019-12-02

## 2019-12-04 ENCOUNTER — TRANSCRIPTION ENCOUNTER (OUTPATIENT)
Age: 84
End: 2019-12-04

## 2019-12-06 ENCOUNTER — APPOINTMENT (OUTPATIENT)
Dept: INTERNAL MEDICINE | Facility: CLINIC | Age: 84
End: 2019-12-06
Payer: MEDICARE

## 2019-12-06 VITALS
BODY MASS INDEX: 22.16 KG/M2 | WEIGHT: 133 LBS | HEART RATE: 86 BPM | SYSTOLIC BLOOD PRESSURE: 109 MMHG | HEIGHT: 65 IN | OXYGEN SATURATION: 99 % | DIASTOLIC BLOOD PRESSURE: 60 MMHG

## 2019-12-06 DIAGNOSIS — R73.03 PREDIABETES.: ICD-10-CM

## 2019-12-06 DIAGNOSIS — L02.01 CUTANEOUS ABSCESS OF FACE: ICD-10-CM

## 2019-12-06 DIAGNOSIS — D64.9 ANEMIA, UNSPECIFIED: ICD-10-CM

## 2019-12-06 PROCEDURE — 96372 THER/PROPH/DIAG INJ SC/IM: CPT

## 2019-12-06 PROCEDURE — 36415 COLL VENOUS BLD VENIPUNCTURE: CPT

## 2019-12-06 PROCEDURE — 99214 OFFICE O/P EST MOD 30 MIN: CPT | Mod: 25

## 2019-12-06 RX ORDER — CYANOCOBALAMIN 1000 UG/ML
1000 INJECTION INTRAMUSCULAR; SUBCUTANEOUS
Qty: 0 | Refills: 0 | Status: COMPLETED | OUTPATIENT
Start: 2019-12-06

## 2019-12-06 RX ORDER — MUPIROCIN 20 MG/G
2 OINTMENT TOPICAL 3 TIMES DAILY
Qty: 1 | Refills: 1 | Status: ACTIVE | COMMUNITY
Start: 2019-12-06 | End: 1900-01-01

## 2019-12-06 RX ADMIN — CYANOCOBALAMIN 0 MCG/ML: 1000 INJECTION, SOLUTION INTRAMUSCULAR; SUBCUTANEOUS at 00:00

## 2019-12-06 NOTE — PLAN
[FreeTextEntry1] : patient's abscess healing well, no secretion; he is afebrile no need of more antibiotics, mupirocin cream prescribed, repeat CBC; CMP, A1C; start vitamin B12 supplement injections weekly;

## 2019-12-06 NOTE — HISTORY OF PRESENT ILLNESS
[FreeTextEntry1] : follow up after urgent care center visit for right face abscess [de-identified] : 89 years old male with senile dementia here for follow up after several visit to urgent care center for right temporal  abscess, that was drained , treated with antibiotic, as per urgent care note , abscess wound healing well, as per patient's son Ovi he needs a visiting nurse to go home to help with wound and coordinate HHA care; no other complains ; patient not taking any other medications, as per son eating well, no recent falls, no other safety issues

## 2019-12-06 NOTE — PHYSICAL EXAM
[No Acute Distress] : no acute distress [Well Developed] : well developed [Well Nourished] : well nourished [Normal Sclera/Conjunctiva] : normal sclera/conjunctiva [Well-Appearing] : well-appearing [PERRL] : pupils equal round and reactive to light [EOMI] : extraocular movements intact [Normal Outer Ear/Nose] : the outer ears and nose were normal in appearance [Normal Oropharynx] : the oropharynx was normal [No JVD] : no jugular venous distention [Supple] : supple [No Lymphadenopathy] : no lymphadenopathy [Thyroid Normal, No Nodules] : the thyroid was normal and there were no nodules present [No Respiratory Distress] : no respiratory distress  [No Accessory Muscle Use] : no accessory muscle use [Clear to Auscultation] : lungs were clear to auscultation bilaterally [Normal Rate] : normal rate  [Regular Rhythm] : with a regular rhythm [Normal S1, S2] : normal S1 and S2 [No Carotid Bruits] : no carotid bruits [No Murmur] : no murmur heard [No Abdominal Bruit] : a ~M bruit was not heard ~T in the abdomen [No Varicosities] : no varicosities [Pedal Pulses Present] : the pedal pulses are present [No Edema] : there was no peripheral edema [No Palpable Aorta] : no palpable aorta [No Extremity Clubbing/Cyanosis] : no extremity clubbing/cyanosis [Soft] : abdomen soft [Non Tender] : non-tender [No Masses] : no abdominal mass palpated [Non-distended] : non-distended [Normal Bowel Sounds] : normal bowel sounds [No HSM] : no HSM [Normal Posterior Cervical Nodes] : no posterior cervical lymphadenopathy [Normal Anterior Cervical Nodes] : no anterior cervical lymphadenopathy [No CVA Tenderness] : no CVA  tenderness [No Spinal Tenderness] : no spinal tenderness [No Joint Swelling] : no joint swelling [Grossly Normal Strength/Tone] : grossly normal strength/tone [No Rash] : no rash [Coordination Grossly Intact] : coordination grossly intact [No Focal Deficits] : no focal deficits [Normal Gait] : normal gait [Deep Tendon Reflexes (DTR)] : deep tendon reflexes were 2+ and symmetric [Normal Affect] : the affect was normal [Normal Insight/Judgement] : insight and judgment were intact [de-identified] : small wound right temporal area no secretion, indurated

## 2019-12-30 NOTE — ED PROVIDER NOTE - PATIENT PORTAL LINK FT
You can access the FollowMyHealth Patient Portal offered by [facility] by registering at the following website: [email or web address] By joining [email or web address] FollowMyHealth portal, you will also be able to view your health information using other applications (apps) compatible with our system.

## 2019-12-30 NOTE — ED PROVIDER NOTE - OBJECTIVE STATEMENT
89 year old male with PMHx of dementia and right eye blindness and no pertinent PSHx presents to the ED with home health aide with complaints of shoulder and back pain today. Patient reports that he sustained a fall on 12/24, which he believes to be the cause of his symptoms. Patient notes that immediately after the fall he was feeling fine, and thus did not seek medical attention at the time. Patient denies any loss of consciousness at the time of the incident. Patient denies all other acute complaints. NKDA.

## 2019-12-30 NOTE — ED ADULT TRIAGE NOTE - CHIEF COMPLAINT QUOTE
HHA REPORTS PT FELL ON 12/24/19 DENIES LOC. TODAY C/O KAREN SHOULDER AND LOWER BACK PAIN. MULTIPLE ECCHYMOTIC AREAS NOTED ON BACK

## 2019-12-30 NOTE — ED PROVIDER NOTE - CLINICAL SUMMARY MEDICAL DECISION MAKING FREE TEXT BOX
Patient with fall one week ago. Patient with bruising on exam. Will do CAT scan, X-ray, and will reassess.

## 2020-01-01 ENCOUNTER — APPOINTMENT (OUTPATIENT)
Dept: INTERNAL MEDICINE | Facility: CLINIC | Age: 85
End: 2020-01-01
Payer: MEDICARE

## 2020-01-01 ENCOUNTER — APPOINTMENT (OUTPATIENT)
Dept: INTERNAL MEDICINE | Facility: CLINIC | Age: 85
End: 2020-01-01

## 2020-01-01 ENCOUNTER — TRANSCRIPTION ENCOUNTER (OUTPATIENT)
Age: 85
End: 2020-01-01

## 2020-01-01 ENCOUNTER — INPATIENT (INPATIENT)
Facility: HOSPITAL | Age: 85
LOS: 4 days | DRG: 871 | End: 2020-12-07
Attending: INTERNAL MEDICINE | Admitting: INTERNAL MEDICINE
Payer: MEDICARE

## 2020-01-01 VITALS
HEART RATE: 55 BPM | SYSTOLIC BLOOD PRESSURE: 90 MMHG | RESPIRATION RATE: 15 BRPM | DIASTOLIC BLOOD PRESSURE: 50 MMHG | OXYGEN SATURATION: 77 %

## 2020-01-01 VITALS
SYSTOLIC BLOOD PRESSURE: 112 MMHG | DIASTOLIC BLOOD PRESSURE: 64 MMHG | HEART RATE: 84 BPM | WEIGHT: 133 LBS | BODY MASS INDEX: 22.16 KG/M2 | OXYGEN SATURATION: 99 % | RESPIRATION RATE: 17 BRPM | TEMPERATURE: 98.1 F | HEIGHT: 65 IN

## 2020-01-01 VITALS
OXYGEN SATURATION: 96 % | SYSTOLIC BLOOD PRESSURE: 124 MMHG | HEIGHT: 70 IN | HEART RATE: 82 BPM | DIASTOLIC BLOOD PRESSURE: 71 MMHG | RESPIRATION RATE: 18 BRPM

## 2020-01-01 DIAGNOSIS — Z23 ENCOUNTER FOR IMMUNIZATION: ICD-10-CM

## 2020-01-01 DIAGNOSIS — E87.5 HYPERKALEMIA: ICD-10-CM

## 2020-01-01 DIAGNOSIS — L85.3 XEROSIS CUTIS: ICD-10-CM

## 2020-01-01 DIAGNOSIS — F03.90 UNSPECIFIED DEMENTIA WITHOUT BEHAVIORAL DISTURBANCE: ICD-10-CM

## 2020-01-01 DIAGNOSIS — E11.22 TYPE 2 DIABETES MELLITUS WITH DIABETIC CHRONIC KIDNEY DISEASE: ICD-10-CM

## 2020-01-01 DIAGNOSIS — I83.93 ASYMPTOMATIC VARICOSE VEINS OF BILATERAL LOWER EXTREMITIES: ICD-10-CM

## 2020-01-01 DIAGNOSIS — Z29.9 ENCOUNTER FOR PROPHYLACTIC MEASURES, UNSPECIFIED: ICD-10-CM

## 2020-01-01 DIAGNOSIS — E53.8 DEFICIENCY OF OTHER SPECIFIED B GROUP VITAMINS: ICD-10-CM

## 2020-01-01 DIAGNOSIS — N39.0 URINARY TRACT INFECTION, SITE NOT SPECIFIED: ICD-10-CM

## 2020-01-01 DIAGNOSIS — F03.91 UNSPECIFIED DEMENTIA WITH BEHAVIORAL DISTURBANCE: ICD-10-CM

## 2020-01-01 DIAGNOSIS — E11.9 TYPE 2 DIABETES MELLITUS W/OUT COMPLICATIONS: ICD-10-CM

## 2020-01-01 DIAGNOSIS — D63.8 ANEMIA IN OTHER CHRONIC DISEASES CLASSIFIED ELSEWHERE: ICD-10-CM

## 2020-01-01 LAB
-  AMIKACIN: SIGNIFICANT CHANGE UP
-  AMIKACIN: SIGNIFICANT CHANGE UP
-  AMOXICILLIN/CLAVULANIC ACID: SIGNIFICANT CHANGE UP
-  AMOXICILLIN/CLAVULANIC ACID: SIGNIFICANT CHANGE UP
-  AMPICILLIN/SULBACTAM: SIGNIFICANT CHANGE UP
-  AMPICILLIN/SULBACTAM: SIGNIFICANT CHANGE UP
-  AMPICILLIN: SIGNIFICANT CHANGE UP
-  AMPICILLIN: SIGNIFICANT CHANGE UP
-  AZTREONAM: SIGNIFICANT CHANGE UP
-  AZTREONAM: SIGNIFICANT CHANGE UP
-  CEFAZOLIN: SIGNIFICANT CHANGE UP
-  CEFAZOLIN: SIGNIFICANT CHANGE UP
-  CEFEPIME: SIGNIFICANT CHANGE UP
-  CEFEPIME: SIGNIFICANT CHANGE UP
-  CEFOXITIN: SIGNIFICANT CHANGE UP
-  CEFOXITIN: SIGNIFICANT CHANGE UP
-  CEFTRIAXONE: SIGNIFICANT CHANGE UP
-  CEFTRIAXONE: SIGNIFICANT CHANGE UP
-  CIPROFLOXACIN: SIGNIFICANT CHANGE UP
-  CIPROFLOXACIN: SIGNIFICANT CHANGE UP
-  ERTAPENEM: SIGNIFICANT CHANGE UP
-  ERTAPENEM: SIGNIFICANT CHANGE UP
-  GENTAMICIN: SIGNIFICANT CHANGE UP
-  GENTAMICIN: SIGNIFICANT CHANGE UP
-  IMIPENEM: SIGNIFICANT CHANGE UP
-  IMIPENEM: SIGNIFICANT CHANGE UP
-  LEVOFLOXACIN: SIGNIFICANT CHANGE UP
-  LEVOFLOXACIN: SIGNIFICANT CHANGE UP
-  MEROPENEM: SIGNIFICANT CHANGE UP
-  MEROPENEM: SIGNIFICANT CHANGE UP
-  NITROFURANTOIN: SIGNIFICANT CHANGE UP
-  NITROFURANTOIN: SIGNIFICANT CHANGE UP
-  PIPERACILLIN/TAZOBACTAM: SIGNIFICANT CHANGE UP
-  PIPERACILLIN/TAZOBACTAM: SIGNIFICANT CHANGE UP
-  TIGECYCLINE: SIGNIFICANT CHANGE UP
-  TIGECYCLINE: SIGNIFICANT CHANGE UP
-  TOBRAMYCIN: SIGNIFICANT CHANGE UP
-  TOBRAMYCIN: SIGNIFICANT CHANGE UP
-  TRIMETHOPRIM/SULFAMETHOXAZOLE: SIGNIFICANT CHANGE UP
-  TRIMETHOPRIM/SULFAMETHOXAZOLE: SIGNIFICANT CHANGE UP
ALBUMIN SERPL ELPH-MCNC: 1.7 G/DL — LOW (ref 3.5–5)
ALBUMIN SERPL ELPH-MCNC: 1.7 G/DL — LOW (ref 3.5–5)
ALBUMIN SERPL ELPH-MCNC: 2.1 G/DL — LOW (ref 3.5–5)
ALP SERPL-CCNC: 121 U/L — HIGH (ref 40–120)
ALP SERPL-CCNC: 88 U/L — SIGNIFICANT CHANGE UP (ref 40–120)
ALP SERPL-CCNC: 94 U/L — SIGNIFICANT CHANGE UP (ref 40–120)
ALT FLD-CCNC: 15 U/L DA — SIGNIFICANT CHANGE UP (ref 10–60)
ALT FLD-CCNC: 15 U/L DA — SIGNIFICANT CHANGE UP (ref 10–60)
ALT FLD-CCNC: 23 U/L DA — SIGNIFICANT CHANGE UP (ref 10–60)
ANION GAP SERPL CALC-SCNC: 13 MMOL/L — SIGNIFICANT CHANGE UP (ref 5–17)
ANION GAP SERPL CALC-SCNC: 7 MMOL/L — SIGNIFICANT CHANGE UP (ref 5–17)
ANION GAP SERPL CALC-SCNC: 8 MMOL/L — SIGNIFICANT CHANGE UP (ref 5–17)
ANION GAP SERPL CALC-SCNC: 9 MMOL/L
ANION GAP SERPL CALC-SCNC: 9 MMOL/L — SIGNIFICANT CHANGE UP (ref 5–17)
APPEARANCE UR: ABNORMAL
AST SERPL-CCNC: 17 U/L — SIGNIFICANT CHANGE UP (ref 10–40)
AST SERPL-CCNC: 19 U/L — SIGNIFICANT CHANGE UP (ref 10–40)
AST SERPL-CCNC: 41 U/L — HIGH (ref 10–40)
BACTERIA # UR AUTO: ABNORMAL /HPF
BASOPHILS # BLD AUTO: 0.05 K/UL — SIGNIFICANT CHANGE UP (ref 0–0.2)
BASOPHILS NFR BLD AUTO: 0.6 % — SIGNIFICANT CHANGE UP (ref 0–2)
BILIRUB SERPL-MCNC: 0.3 MG/DL — SIGNIFICANT CHANGE UP (ref 0.2–1.2)
BILIRUB SERPL-MCNC: 0.4 MG/DL — SIGNIFICANT CHANGE UP (ref 0.2–1.2)
BILIRUB SERPL-MCNC: 0.4 MG/DL — SIGNIFICANT CHANGE UP (ref 0.2–1.2)
BILIRUB UR-MCNC: NEGATIVE — SIGNIFICANT CHANGE UP
BUN SERPL-MCNC: 20 MG/DL
BUN SERPL-MCNC: 24 MG/DL — HIGH (ref 7–18)
BUN SERPL-MCNC: 29 MG/DL — HIGH (ref 7–18)
BUN SERPL-MCNC: 31 MG/DL — HIGH (ref 7–18)
BUN SERPL-MCNC: 35 MG/DL — HIGH (ref 7–18)
CALCIUM SERPL-MCNC: 8.2 MG/DL — LOW (ref 8.4–10.5)
CALCIUM SERPL-MCNC: 8.3 MG/DL — LOW (ref 8.4–10.5)
CALCIUM SERPL-MCNC: 8.5 MG/DL — SIGNIFICANT CHANGE UP (ref 8.4–10.5)
CALCIUM SERPL-MCNC: 9.2 MG/DL — SIGNIFICANT CHANGE UP (ref 8.4–10.5)
CALCIUM SERPL-MCNC: 9.5 MG/DL
CHLORIDE SERPL-SCNC: 102 MMOL/L
CHLORIDE SERPL-SCNC: 104 MMOL/L — SIGNIFICANT CHANGE UP (ref 96–108)
CHLORIDE SERPL-SCNC: 112 MMOL/L — HIGH (ref 96–108)
CHLORIDE SERPL-SCNC: 116 MMOL/L — HIGH (ref 96–108)
CHLORIDE SERPL-SCNC: 122 MMOL/L — HIGH (ref 96–108)
CO2 SERPL-SCNC: 17 MMOL/L — LOW (ref 22–31)
CO2 SERPL-SCNC: 23 MMOL/L — SIGNIFICANT CHANGE UP (ref 22–31)
CO2 SERPL-SCNC: 24 MMOL/L — SIGNIFICANT CHANGE UP (ref 22–31)
CO2 SERPL-SCNC: 27 MMOL/L
CO2 SERPL-SCNC: 28 MMOL/L — SIGNIFICANT CHANGE UP (ref 22–31)
COLOR SPEC: YELLOW — SIGNIFICANT CHANGE UP
COMMENT - URINE: SIGNIFICANT CHANGE UP
CREAT SERPL-MCNC: 0.86 MG/DL — SIGNIFICANT CHANGE UP (ref 0.5–1.3)
CREAT SERPL-MCNC: 1.07 MG/DL — SIGNIFICANT CHANGE UP (ref 0.5–1.3)
CREAT SERPL-MCNC: 1.18 MG/DL
CREAT SERPL-MCNC: 1.19 MG/DL — SIGNIFICANT CHANGE UP (ref 0.5–1.3)
CREAT SERPL-MCNC: 1.39 MG/DL — HIGH (ref 0.5–1.3)
CULTURE RESULTS: SIGNIFICANT CHANGE UP
DIFF PNL FLD: ABNORMAL
EOSINOPHIL # BLD AUTO: 0.03 K/UL — SIGNIFICANT CHANGE UP (ref 0–0.5)
EOSINOPHIL NFR BLD AUTO: 0.4 % — SIGNIFICANT CHANGE UP (ref 0–6)
GLUCOSE SERPL-MCNC: 117 MG/DL — HIGH (ref 70–99)
GLUCOSE SERPL-MCNC: 125 MG/DL
GLUCOSE SERPL-MCNC: 85 MG/DL — SIGNIFICANT CHANGE UP (ref 70–99)
GLUCOSE SERPL-MCNC: 95 MG/DL — SIGNIFICANT CHANGE UP (ref 70–99)
GLUCOSE SERPL-MCNC: 99 MG/DL — SIGNIFICANT CHANGE UP (ref 70–99)
GLUCOSE UR QL: NEGATIVE — SIGNIFICANT CHANGE UP
HCT VFR BLD CALC: 29.3 % — LOW (ref 39–50)
HCT VFR BLD CALC: 30.1 % — LOW (ref 39–50)
HCT VFR BLD CALC: 31.1 % — LOW (ref 39–50)
HCT VFR BLD CALC: 33.7 % — LOW (ref 39–50)
HGB BLD-MCNC: 10 G/DL — LOW (ref 13–17)
HGB BLD-MCNC: 8.5 G/DL — LOW (ref 13–17)
HGB BLD-MCNC: 8.7 G/DL — LOW (ref 13–17)
HGB BLD-MCNC: 9.4 G/DL — LOW (ref 13–17)
IMM GRANULOCYTES NFR BLD AUTO: 0.7 % — SIGNIFICANT CHANGE UP (ref 0–1.5)
KETONES UR-MCNC: NEGATIVE — SIGNIFICANT CHANGE UP
LACTATE SERPL-SCNC: 1.6 MMOL/L — SIGNIFICANT CHANGE UP (ref 0.7–2)
LACTATE SERPL-SCNC: 2.5 MMOL/L — HIGH (ref 0.7–2)
LACTATE SERPL-SCNC: 3 MMOL/L — HIGH (ref 0.7–2)
LACTATE SERPL-SCNC: 3.7 MMOL/L — HIGH (ref 0.7–2)
LEUKOCYTE ESTERASE UR-ACNC: ABNORMAL
LYMPHOCYTES # BLD AUTO: 2.83 K/UL — SIGNIFICANT CHANGE UP (ref 1–3.3)
LYMPHOCYTES # BLD AUTO: 33.1 % — SIGNIFICANT CHANGE UP (ref 13–44)
MCHC RBC-ENTMCNC: 24.4 PG — LOW (ref 27–34)
MCHC RBC-ENTMCNC: 24.8 PG — LOW (ref 27–34)
MCHC RBC-ENTMCNC: 24.9 PG — LOW (ref 27–34)
MCHC RBC-ENTMCNC: 25.5 PG — LOW (ref 27–34)
MCHC RBC-ENTMCNC: 28.9 GM/DL — LOW (ref 32–36)
MCHC RBC-ENTMCNC: 29 GM/DL — LOW (ref 32–36)
MCHC RBC-ENTMCNC: 29.7 GM/DL — LOW (ref 32–36)
MCHC RBC-ENTMCNC: 30.2 GM/DL — LOW (ref 32–36)
MCV RBC AUTO: 83.8 FL — SIGNIFICANT CHANGE UP (ref 80–100)
MCV RBC AUTO: 84.3 FL — SIGNIFICANT CHANGE UP (ref 80–100)
MCV RBC AUTO: 84.6 FL — SIGNIFICANT CHANGE UP (ref 80–100)
MCV RBC AUTO: 85.4 FL — SIGNIFICANT CHANGE UP (ref 80–100)
METHOD TYPE: SIGNIFICANT CHANGE UP
METHOD TYPE: SIGNIFICANT CHANGE UP
MONOCYTES # BLD AUTO: 0.48 K/UL — SIGNIFICANT CHANGE UP (ref 0–0.9)
MONOCYTES NFR BLD AUTO: 5.6 % — SIGNIFICANT CHANGE UP (ref 2–14)
NEUTROPHILS # BLD AUTO: 5.11 K/UL — SIGNIFICANT CHANGE UP (ref 1.8–7.4)
NEUTROPHILS NFR BLD AUTO: 59.6 % — SIGNIFICANT CHANGE UP (ref 43–77)
NITRITE UR-MCNC: POSITIVE
NRBC # BLD: 0 /100 WBCS — SIGNIFICANT CHANGE UP (ref 0–0)
ORGANISM # SPEC MICROSCOPIC CNT: SIGNIFICANT CHANGE UP
PH UR: 8 — SIGNIFICANT CHANGE UP (ref 5–8)
PLATELET # BLD AUTO: 420 K/UL — HIGH (ref 150–400)
PLATELET # BLD AUTO: 505 K/UL — HIGH (ref 150–400)
PLATELET # BLD AUTO: 545 K/UL — HIGH (ref 150–400)
PLATELET # BLD AUTO: 611 K/UL — HIGH (ref 150–400)
POTASSIUM SERPL-MCNC: 3.4 MMOL/L — LOW (ref 3.5–5.3)
POTASSIUM SERPL-MCNC: 3.9 MMOL/L — SIGNIFICANT CHANGE UP (ref 3.5–5.3)
POTASSIUM SERPL-MCNC: 3.9 MMOL/L — SIGNIFICANT CHANGE UP (ref 3.5–5.3)
POTASSIUM SERPL-MCNC: 4.6 MMOL/L — SIGNIFICANT CHANGE UP (ref 3.5–5.3)
POTASSIUM SERPL-SCNC: 3.4 MMOL/L — LOW (ref 3.5–5.3)
POTASSIUM SERPL-SCNC: 3.9 MMOL/L — SIGNIFICANT CHANGE UP (ref 3.5–5.3)
POTASSIUM SERPL-SCNC: 3.9 MMOL/L — SIGNIFICANT CHANGE UP (ref 3.5–5.3)
POTASSIUM SERPL-SCNC: 4.6 MMOL/L
POTASSIUM SERPL-SCNC: 4.6 MMOL/L — SIGNIFICANT CHANGE UP (ref 3.5–5.3)
PROT SERPL-MCNC: 5.6 G/DL — LOW (ref 6–8.3)
PROT SERPL-MCNC: 6 G/DL — SIGNIFICANT CHANGE UP (ref 6–8.3)
PROT SERPL-MCNC: 7.3 G/DL — SIGNIFICANT CHANGE UP (ref 6–8.3)
PROT UR-MCNC: 100
RBC # BLD: 3.43 M/UL — LOW (ref 4.2–5.8)
RBC # BLD: 3.56 M/UL — LOW (ref 4.2–5.8)
RBC # BLD: 3.69 M/UL — LOW (ref 4.2–5.8)
RBC # BLD: 4.02 M/UL — LOW (ref 4.2–5.8)
RBC # FLD: 16.1 % — HIGH (ref 10.3–14.5)
RBC # FLD: 16.2 % — HIGH (ref 10.3–14.5)
RBC # FLD: 16.3 % — HIGH (ref 10.3–14.5)
RBC # FLD: 16.6 % — HIGH (ref 10.3–14.5)
RBC CASTS # UR COMP ASSIST: SIGNIFICANT CHANGE UP /HPF (ref 0–2)
SARS-COV-2 IGG SERPL QL IA: NEGATIVE — SIGNIFICANT CHANGE UP
SARS-COV-2 IGM SERPL IA-ACNC: 0.12 INDEX — SIGNIFICANT CHANGE UP
SARS-COV-2 RNA SPEC QL NAA+PROBE: SIGNIFICANT CHANGE UP
SODIUM SERPL-SCNC: 138 MMOL/L
SODIUM SERPL-SCNC: 139 MMOL/L — SIGNIFICANT CHANGE UP (ref 135–145)
SODIUM SERPL-SCNC: 144 MMOL/L — SIGNIFICANT CHANGE UP (ref 135–145)
SODIUM SERPL-SCNC: 148 MMOL/L — HIGH (ref 135–145)
SODIUM SERPL-SCNC: 152 MMOL/L — HIGH (ref 135–145)
SP GR SPEC: 1.01 — SIGNIFICANT CHANGE UP (ref 1.01–1.02)
SPECIMEN SOURCE: SIGNIFICANT CHANGE UP
TRI-PHOS CRY UR QL COMP ASSIST: ABNORMAL /HPF
UROBILINOGEN FLD QL: 1
WBC # BLD: 5.91 K/UL — SIGNIFICANT CHANGE UP (ref 3.8–10.5)
WBC # BLD: 7.06 K/UL — SIGNIFICANT CHANGE UP (ref 3.8–10.5)
WBC # BLD: 7.68 K/UL — SIGNIFICANT CHANGE UP (ref 3.8–10.5)
WBC # BLD: 8.56 K/UL — SIGNIFICANT CHANGE UP (ref 3.8–10.5)
WBC # FLD AUTO: 5.91 K/UL — SIGNIFICANT CHANGE UP (ref 3.8–10.5)
WBC # FLD AUTO: 7.06 K/UL — SIGNIFICANT CHANGE UP (ref 3.8–10.5)
WBC # FLD AUTO: 7.68 K/UL — SIGNIFICANT CHANGE UP (ref 3.8–10.5)
WBC # FLD AUTO: 8.56 K/UL — SIGNIFICANT CHANGE UP (ref 3.8–10.5)
WBC UR QL: >50 /HPF (ref 0–5)

## 2020-01-01 PROCEDURE — 74177 CT ABD & PELVIS W/CONTRAST: CPT | Mod: 26

## 2020-01-01 PROCEDURE — 99223 1ST HOSP IP/OBS HIGH 75: CPT

## 2020-01-01 PROCEDURE — G0008: CPT

## 2020-01-01 PROCEDURE — 99233 SBSQ HOSP IP/OBS HIGH 50: CPT

## 2020-01-01 PROCEDURE — 99285 EMERGENCY DEPT VISIT HI MDM: CPT

## 2020-01-01 PROCEDURE — 99214 OFFICE O/P EST MOD 30 MIN: CPT | Mod: 25

## 2020-01-01 PROCEDURE — 90686 IIV4 VACC NO PRSV 0.5 ML IM: CPT

## 2020-01-01 PROCEDURE — 96372 THER/PROPH/DIAG INJ SC/IM: CPT

## 2020-01-01 RX ORDER — SODIUM CHLORIDE 9 MG/ML
1000 INJECTION INTRAMUSCULAR; INTRAVENOUS; SUBCUTANEOUS ONCE
Refills: 0 | Status: COMPLETED | OUTPATIENT
Start: 2020-01-01 | End: 2020-01-01

## 2020-01-01 RX ORDER — PETROLATUM,WHITE
1 JELLY (GRAM) TOPICAL THREE TIMES A DAY
Refills: 0 | Status: DISCONTINUED | OUTPATIENT
Start: 2020-01-01 | End: 2020-01-01

## 2020-01-01 RX ORDER — CEFTRIAXONE 500 MG/1
1000 INJECTION, POWDER, FOR SOLUTION INTRAMUSCULAR; INTRAVENOUS ONCE
Refills: 0 | Status: COMPLETED | OUTPATIENT
Start: 2020-01-01 | End: 2020-01-01

## 2020-01-01 RX ORDER — MEROPENEM 1 G/30ML
1000 INJECTION INTRAVENOUS EVERY 8 HOURS
Refills: 0 | Status: DISCONTINUED | OUTPATIENT
Start: 2020-01-01 | End: 2020-01-01

## 2020-01-01 RX ORDER — HYDROMORPHONE HYDROCHLORIDE 2 MG/ML
0.5 INJECTION INTRAMUSCULAR; INTRAVENOUS; SUBCUTANEOUS
Refills: 0 | Status: DISCONTINUED | OUTPATIENT
Start: 2020-01-01 | End: 2020-01-01

## 2020-01-01 RX ORDER — FERROUS SULFATE TAB EC 324 MG (65 MG FE EQUIVALENT) 324 (65 FE) MG
324 (65 FE) TABLET DELAYED RESPONSE ORAL
Qty: 90 | Refills: 0 | Status: ACTIVE | COMMUNITY
Start: 2020-01-01 | End: 1900-01-01

## 2020-01-01 RX ORDER — ROBINUL 0.2 MG/ML
0.4 INJECTION INTRAMUSCULAR; INTRAVENOUS EVERY 6 HOURS
Refills: 0 | Status: DISCONTINUED | OUTPATIENT
Start: 2020-01-01 | End: 2020-01-01

## 2020-01-01 RX ORDER — HYDROCORTISONE 1 %
12 CREAM (GRAM) TOPICAL
Qty: 1 | Refills: 2 | Status: ACTIVE | COMMUNITY
Start: 2019-12-06 | End: 1900-01-01

## 2020-01-01 RX ORDER — HEPARIN SODIUM 5000 [USP'U]/ML
5000 INJECTION INTRAVENOUS; SUBCUTANEOUS EVERY 8 HOURS
Refills: 0 | Status: DISCONTINUED | OUTPATIENT
Start: 2020-01-01 | End: 2020-01-01

## 2020-01-01 RX ORDER — SODIUM CHLORIDE 9 MG/ML
1000 INJECTION, SOLUTION INTRAVENOUS
Refills: 0 | Status: DISCONTINUED | OUTPATIENT
Start: 2020-01-01 | End: 2020-01-01

## 2020-01-01 RX ORDER — MEROPENEM 1 G/30ML
1000 INJECTION INTRAVENOUS EVERY 12 HOURS
Refills: 0 | Status: DISCONTINUED | OUTPATIENT
Start: 2020-01-01 | End: 2020-01-01

## 2020-01-01 RX ORDER — MEROPENEM 1 G/30ML
1000 INJECTION INTRAVENOUS ONCE
Refills: 0 | Status: COMPLETED | OUTPATIENT
Start: 2020-01-01 | End: 2020-01-01

## 2020-01-01 RX ORDER — ENOXAPARIN SODIUM 100 MG/ML
40 INJECTION SUBCUTANEOUS DAILY
Refills: 0 | Status: DISCONTINUED | OUTPATIENT
Start: 2020-01-01 | End: 2020-01-01

## 2020-01-01 RX ORDER — HALOPERIDOL DECANOATE 100 MG/ML
2 INJECTION INTRAMUSCULAR ONCE
Refills: 0 | Status: DISCONTINUED | OUTPATIENT
Start: 2020-01-01 | End: 2020-01-01

## 2020-01-01 RX ORDER — MEROPENEM 1 G/30ML
INJECTION INTRAVENOUS
Refills: 0 | Status: DISCONTINUED | OUTPATIENT
Start: 2020-01-01 | End: 2020-01-01

## 2020-01-01 RX ORDER — KETOROLAC TROMETHAMINE 30 MG/ML
15 SYRINGE (ML) INJECTION ONCE
Refills: 0 | Status: DISCONTINUED | OUTPATIENT
Start: 2020-01-01 | End: 2020-01-01

## 2020-01-01 RX ORDER — TAMSULOSIN HYDROCHLORIDE 0.4 MG/1
0.4 CAPSULE ORAL AT BEDTIME
Refills: 0 | Status: DISCONTINUED | OUTPATIENT
Start: 2020-01-01 | End: 2020-01-01

## 2020-01-01 RX ORDER — CYANOCOBALAMIN 1000 UG/ML
1000 INJECTION INTRAMUSCULAR; SUBCUTANEOUS
Qty: 0 | Refills: 0 | Status: COMPLETED | OUTPATIENT
Start: 2020-01-01

## 2020-01-01 RX ADMIN — SODIUM CHLORIDE 1000 MILLILITER(S): 9 INJECTION INTRAMUSCULAR; INTRAVENOUS; SUBCUTANEOUS at 14:07

## 2020-01-01 RX ADMIN — CYANOCOBALAMIN 0 MCG/ML: 1000 INJECTION, SOLUTION INTRAMUSCULAR; SUBCUTANEOUS at 00:00

## 2020-01-01 RX ADMIN — MEROPENEM 100 MILLIGRAM(S): 1 INJECTION INTRAVENOUS at 17:10

## 2020-01-01 RX ADMIN — HEPARIN SODIUM 5000 UNIT(S): 5000 INJECTION INTRAVENOUS; SUBCUTANEOUS at 21:15

## 2020-01-01 RX ADMIN — CEFTRIAXONE 1000 MILLIGRAM(S): 500 INJECTION, POWDER, FOR SOLUTION INTRAMUSCULAR; INTRAVENOUS at 11:43

## 2020-01-01 RX ADMIN — HEPARIN SODIUM 5000 UNIT(S): 5000 INJECTION INTRAVENOUS; SUBCUTANEOUS at 15:18

## 2020-01-01 RX ADMIN — TAMSULOSIN HYDROCHLORIDE 0.4 MILLIGRAM(S): 0.4 CAPSULE ORAL at 21:58

## 2020-01-01 RX ADMIN — SODIUM CHLORIDE 1000 MILLILITER(S): 9 INJECTION INTRAMUSCULAR; INTRAVENOUS; SUBCUTANEOUS at 13:00

## 2020-01-01 RX ADMIN — MEROPENEM 100 MILLIGRAM(S): 1 INJECTION INTRAVENOUS at 05:17

## 2020-01-01 RX ADMIN — MEROPENEM 100 MILLIGRAM(S): 1 INJECTION INTRAVENOUS at 17:35

## 2020-01-01 RX ADMIN — MEROPENEM 100 MILLIGRAM(S): 1 INJECTION INTRAVENOUS at 05:47

## 2020-01-01 RX ADMIN — SODIUM CHLORIDE 1000 MILLILITER(S): 9 INJECTION INTRAMUSCULAR; INTRAVENOUS; SUBCUTANEOUS at 11:10

## 2020-01-01 RX ADMIN — MEROPENEM 100 MILLIGRAM(S): 1 INJECTION INTRAVENOUS at 21:18

## 2020-01-01 RX ADMIN — HEPARIN SODIUM 5000 UNIT(S): 5000 INJECTION INTRAVENOUS; SUBCUTANEOUS at 05:48

## 2020-01-01 RX ADMIN — HEPARIN SODIUM 5000 UNIT(S): 5000 INJECTION INTRAVENOUS; SUBCUTANEOUS at 13:34

## 2020-01-01 RX ADMIN — MEROPENEM 100 MILLIGRAM(S): 1 INJECTION INTRAVENOUS at 17:58

## 2020-01-01 RX ADMIN — TAMSULOSIN HYDROCHLORIDE 0.4 MILLIGRAM(S): 0.4 CAPSULE ORAL at 21:19

## 2020-01-01 RX ADMIN — SODIUM CHLORIDE 75 MILLILITER(S): 9 INJECTION, SOLUTION INTRAVENOUS at 21:29

## 2020-01-01 RX ADMIN — HEPARIN SODIUM 5000 UNIT(S): 5000 INJECTION INTRAVENOUS; SUBCUTANEOUS at 21:19

## 2020-01-01 RX ADMIN — MEROPENEM 100 MILLIGRAM(S): 1 INJECTION INTRAVENOUS at 17:18

## 2020-01-01 RX ADMIN — HEPARIN SODIUM 5000 UNIT(S): 5000 INJECTION INTRAVENOUS; SUBCUTANEOUS at 21:18

## 2020-01-01 RX ADMIN — HEPARIN SODIUM 5000 UNIT(S): 5000 INJECTION INTRAVENOUS; SUBCUTANEOUS at 15:20

## 2020-01-01 RX ADMIN — TAMSULOSIN HYDROCHLORIDE 0.4 MILLIGRAM(S): 0.4 CAPSULE ORAL at 21:15

## 2020-01-01 RX ADMIN — SODIUM CHLORIDE 1000 MILLILITER(S): 9 INJECTION INTRAMUSCULAR; INTRAVENOUS; SUBCUTANEOUS at 12:58

## 2020-01-01 RX ADMIN — HEPARIN SODIUM 5000 UNIT(S): 5000 INJECTION INTRAVENOUS; SUBCUTANEOUS at 21:57

## 2020-01-01 RX ADMIN — HEPARIN SODIUM 5000 UNIT(S): 5000 INJECTION INTRAVENOUS; SUBCUTANEOUS at 14:32

## 2020-01-01 RX ADMIN — SODIUM CHLORIDE 1000 MILLILITER(S): 9 INJECTION INTRAMUSCULAR; INTRAVENOUS; SUBCUTANEOUS at 11:43

## 2020-01-01 RX ADMIN — MEROPENEM 100 MILLIGRAM(S): 1 INJECTION INTRAVENOUS at 05:22

## 2020-01-01 RX ADMIN — Medication 1 APPLICATION(S): at 13:28

## 2020-01-01 RX ADMIN — HEPARIN SODIUM 5000 UNIT(S): 5000 INJECTION INTRAVENOUS; SUBCUTANEOUS at 05:17

## 2020-01-01 RX ADMIN — SODIUM CHLORIDE 75 MILLILITER(S): 9 INJECTION, SOLUTION INTRAVENOUS at 05:17

## 2020-01-01 RX ADMIN — SODIUM CHLORIDE 75 MILLILITER(S): 9 INJECTION, SOLUTION INTRAVENOUS at 00:13

## 2020-01-01 RX ADMIN — MEROPENEM 100 MILLIGRAM(S): 1 INJECTION INTRAVENOUS at 09:57

## 2020-01-01 RX ADMIN — CEFTRIAXONE 100 MILLIGRAM(S): 500 INJECTION, POWDER, FOR SOLUTION INTRAMUSCULAR; INTRAVENOUS at 11:11

## 2020-01-01 RX ADMIN — HEPARIN SODIUM 5000 UNIT(S): 5000 INJECTION INTRAVENOUS; SUBCUTANEOUS at 05:22

## 2020-01-01 RX ADMIN — HEPARIN SODIUM 5000 UNIT(S): 5000 INJECTION INTRAVENOUS; SUBCUTANEOUS at 05:30

## 2020-02-06 PROBLEM — L85.3 XEROSIS OF SKIN: Status: ACTIVE | Noted: 2018-03-27

## 2020-02-06 PROBLEM — E87.5 HYPERKALEMIA: Status: ACTIVE | Noted: 2020-01-01

## 2020-02-06 PROBLEM — Z23 NEED FOR INFLUENZA VACCINATION: Status: ACTIVE | Noted: 2020-01-01

## 2020-02-06 PROBLEM — I83.93 VARICOSE VEINS OF BOTH LOWER EXTREMITIES: Status: ACTIVE | Noted: 2020-01-01

## 2020-02-06 PROBLEM — E53.8 VITAMIN B12 DEFICIENCY: Status: ACTIVE | Noted: 2019-12-06

## 2020-02-06 PROBLEM — F03.91 SENILE DEMENTIA WITH BEHAVIORAL DISTURBANCE: Status: ACTIVE | Noted: 2018-10-05

## 2020-02-06 PROBLEM — D63.8 ANEMIA OF OTHER CHRONIC DISEASE: Status: ACTIVE | Noted: 2020-01-01

## 2020-02-06 PROBLEM — E11.9 CONTROLLED TYPE 2 DIABETES MELLITUS WITHOUT COMPLICATION, WITHOUT LONG-TERM CURRENT USE OF INSULIN: Status: ACTIVE | Noted: 2020-01-01

## 2020-02-06 PROBLEM — E11.22 CHRONIC KIDNEY DISEASE IN TYPE 2 DIABETES MELLITUS: Status: ACTIVE | Noted: 2020-01-01

## 2020-02-06 NOTE — PHYSICAL EXAM
[No Acute Distress] : no acute distress [Well Developed] : well developed [Well Nourished] : well nourished [Well-Appearing] : well-appearing [Normal Sclera/Conjunctiva] : normal sclera/conjunctiva [PERRL] : pupils equal round and reactive to light [EOMI] : extraocular movements intact [Normal Outer Ear/Nose] : the outer ears and nose were normal in appearance [Normal Oropharynx] : the oropharynx was normal [No Lymphadenopathy] : no lymphadenopathy [No JVD] : no jugular venous distention [Supple] : supple [Thyroid Normal, No Nodules] : the thyroid was normal and there were no nodules present [No Respiratory Distress] : no respiratory distress  [No Accessory Muscle Use] : no accessory muscle use [Normal Rate] : normal rate  [Clear to Auscultation] : lungs were clear to auscultation bilaterally [Normal S1, S2] : normal S1 and S2 [Regular Rhythm] : with a regular rhythm [No Carotid Bruits] : no carotid bruits [No Abdominal Bruit] : a ~M bruit was not heard ~T in the abdomen [No Murmur] : no murmur heard [No Palpable Aorta] : no palpable aorta [No Edema] : there was no peripheral edema [Pedal Pulses Present] : the pedal pulses are present [No Extremity Clubbing/Cyanosis] : no extremity clubbing/cyanosis [Soft] : abdomen soft [Non-distended] : non-distended [Non Tender] : non-tender [No Masses] : no abdominal mass palpated [No HSM] : no HSM [Normal Bowel Sounds] : normal bowel sounds [Normal Anterior Cervical Nodes] : no anterior cervical lymphadenopathy [Normal Posterior Cervical Nodes] : no posterior cervical lymphadenopathy [No Spinal Tenderness] : no spinal tenderness [No CVA Tenderness] : no CVA  tenderness [No Joint Swelling] : no joint swelling [Grossly Normal Strength/Tone] : grossly normal strength/tone [No Rash] : no rash [No Focal Deficits] : no focal deficits [Normal Gait] : normal gait [Coordination Grossly Intact] : coordination grossly intact [Normal Affect] : the affect was normal [Normal Insight/Judgement] : insight and judgment were intact [Deep Tendon Reflexes (DTR)] : deep tendon reflexes were 2+ and symmetric [de-identified] : large tortuos varicose vein left leg, no edema , no varicose ulcers [de-identified] : hyperpigmented skin around ankles

## 2020-02-06 NOTE — HISTORY OF PRESENT ILLNESS
[FreeTextEntry1] : follow up senile dementia [de-identified] : 89 years old male with senile dementia, vitamin B12 deficiency , patient came with home health aide; as per her patient's family concerned about left ankle varicose veins , denies any ulcer, denies edema ; offers no other complains

## 2020-02-06 NOTE — PLAN
[FreeTextEntry1] : Dietary counseling given, dietary avoidance discussed, diet and exercise reviewed with patient; patient reminded of importance of aerobic exercise, weight control, dietary compliance and regular glucose monitoring\par discussed with home health aide to inform family\par will repeat potassium today\par cyanocobalamin injection today , vitamin B12 prescribe; start ferrous sulfate; follow up in three months

## 2020-03-23 NOTE — ED ADULT NURSE NOTE - CHIEF COMPLAINT QUOTE
pt fell yesterday, on floor for over 10 hours, found by family last night at 10 p.m., pt today with generalized weakness and confusion, family concerned for dehydration 38.2

## 2020-08-04 NOTE — ED ADULT TRIAGE NOTE - TEMPERATURE IN CELSIUS (DEGREES C)
August 4, 2020     Patient: Harley Boyce   YOB: 1981   Date of Visit: 8/4/2020       To Whom it May Concern:    Harley Boyce was seen in my clinic on 8/4/2020 at 3:30 pm.    Please excuse Harley for his absence from work on the date listed above through 07/31/2020 due to uncontrolled hypertension. He may return to work 08/05/2020.      Sincerely,         Kam MOORE MD    Medical information is confidential and cannot be disclosed without the written consent of the patient or his representative.      
36.3

## 2020-12-02 NOTE — ED ADULT NURSE NOTE - NSIMPLEMENTINTERV_GEN_ALL_ED
Implemented All Fall Risk Interventions:  [place] to call system. Call bell, personal items and telephone within reach. Instruct patient to call for assistance. Room bathroom lighting operational. Non-slip footwear when patient is off stretcher. Physically safe environment: no spills, clutter or unnecessary equipment. Stretcher in lowest position, wheels locked, appropriate side rails in place. Provide visual cue, wrist band, yellow gown, etc. Monitor gait and stability. Monitor for mental status changes and reorient to person, place, and time. Review medications for side effects contributing to fall risk. Reinforce activity limits and safety measures with patient and family.

## 2020-12-02 NOTE — H&P ADULT - PROBLEM SELECTOR PLAN 1
h/o recurrent UTI, green , pussy penile discharge  Cephalexin 500mg BIDX1 course in October &  Cefuroxime 500mg BID X1 course in November without resolution of sx.  U/A significant for: elevated WBC, Bacteruria   s/p Rocephin 1gr in ED   Management:  - will start on meropenem given history of recurrent UTI  - Check U/C   - f/u CT pelvic and abdomen with contrast h/o recurrent UTI, green , pussy penile discharge  Cephalexin 500mg BIDX1 course in October &  Cefuroxime 500mg BID X1 course in November without resolution of sx.  U/A significant for: elevated WBC, Bacteruria   s/p Rocephin 1gr in ED   Management:  - will start on meropenem given history of recurrent UTI  - Check U/C   - f/u CT pelvic and abdomen with contrast  - Urology was informed , rec to treat the infection for now as patient is elderly and currently voiding and touch base with urology tomorrow in case he is retaining.   - Dr Duffy Consulted

## 2020-12-02 NOTE — PATIENT PROFILE ADULT - NSPROGENSOURCEINFO_GEN_A_NUR
unable to respond/Tried calling daughters listed but no answer patient/unable to respond/Tried calling daughters listed but no answer

## 2020-12-02 NOTE — H&P ADULT - ATTENDING COMMENTS
89 y/o M pt from home bedbound have HHA 24/7 with a PMHx of Dementia presents to ED for reported greenish penile discharge by HHA. Pt AAOX0, History was obtained from daughter  Swapna daughter ([phone]). reported Pt had a UTI in October and completed a course of Cephalexin 500mg BID but sx persisted in November and he went to urgent care where he was given Cefuroxime 500mg BID.  patient was c/o  dysuria, frequency  and when home aid went to change diaper noticed purulent greenish discharge in diaper. pt recently could not take solid food , lost 25 pounds in last 2 month. pt had a decline in cognitive function recently. denies fever , chills , chest pain, sob or any other acute complains.     ED course : Pt noted to be afebrile , wbc no elevated  have positive UA , lactate of 3.7 , was given Rocephin 1grX1 and 2 Liters NC , repeat lactate is 3. 3rd bolus was given.     GOC: DNI/DNR      assessment  --  sepsis, uti, h/o dementia    plan  --  admit to med, rocephin cont preadmit home meds, gi and dvt profilaxis, ivf  cbc, bmp, mg, phos, lipids, tsh, bld cx, ua, ucx,     palliative eval

## 2020-12-02 NOTE — PATIENT PROFILE ADULT - NSPROHMSYMPCOND_GEN_A_NUR
patient with cognitive impairment. Unable to assess none/patient with cognitive impairment. Unable to assess

## 2020-12-02 NOTE — H&P ADULT - NSHPPHYSICALEXAM_GEN_ALL_CORE
CONSTITUTIONAL:   ENMT: Airway patent, Nasal mucosa clear. Mouth with normal mucosa. Throat has no vesicles, no oropharyngeal exudates and uvula is midline.  EYES: Clear bilaterally, pupils equal, round and reactive to light. EOMI.  CARDIAC: Normal rate, regular rhythm.  Heart sounds S1, S2.  No murmurs, rubs or gallops   RESPIRATORY: Breath sounds clear and equal bilaterally. No wheezes, rhales or rhonchi  MUSCULOSKELETAL: Spine appears normal, range of motion is not limited, no muscle or joint tenderness  EXTREMITIES: No edema, cyanosis or deformity   NEUROLOGICAL: Alert and oriented, no focal deficits, no motor or sensory deficits.  SKIN: No rash, skin turgor   ABDOMAN : NT , ND, CONSTITUTIONAL: very cachectic, agitated   ENMT: dry mucus   EYES: Clear bilaterally, pupils equal, round and reactive to light. EOMI.  CARDIAC: Normal rate, regular rhythm.  Heart sounds S1, S2.  No murmurs, rubs or gallops   RESPIRATORY: Breath sounds clear and equal bilaterally. No wheezes, rhals or rhonchi  EXTREMITIES: No edema, cyanosis or deformity  NEUROLOGICAL: AAOX0, agitated   SKIN: No rash, skin turgor   ABDOMAN : NT , ND

## 2020-12-02 NOTE — CONSULT NOTE ADULT - SUBJECTIVE AND OBJECTIVE BOX
HPI:  89 y/o M pt from home bedbound have HHA 24/7 with a PMHx of Dementia presents to ED for reported greenish penile discharge by HHA. Pt AAOX0, History was obtained from daughter  Swapna daughter ([phone]). reported Pt had a UTI in October and completed a course of Cephalexin 500mg BID but sx persisted in November and he went to urgent care where he was given Cefuroxime 500mg BID.  patient was c/o  dysuria, frequency  and when home aid went to change diaper noticed purulent greenish discharge in diaper. pt recently could not take solid food , lost 25 pounds in last 2 month. pt had a decline in cognitive function recently. denies fever , chills , chest pain, sob or any other acute complains.     ED course : Pt noted to be afebrile , wbc no elevated  have positive UA , lactate of 3.7 , was given Rocephin 1grX1 and 2 Liters NC , repeat lactate is 3. 3rd bolus was given.     GOC: DNI/DNR   (02 Dec 2020 13:35)      PAST MEDICAL & SURGICAL HISTORY:  Blind right eye    Dementia    No significant past surgical history        Vital Signs Last 24 Hrs  T(C): 36.8 (02 Dec 2020 19:21), Max: 36.9 (02 Dec 2020 13:34)  T(F): 98.2 (02 Dec 2020 19:21), Max: 98.4 (02 Dec 2020 13:34)  HR: 100 (02 Dec 2020 19:21) (82 - 100)  BP: 139/64 (02 Dec 2020 19:21) (124/71 - 141/65)  BP(mean): --  RR: 17 (02 Dec 2020 19:21) (17 - 18)  SpO2: 95% (02 Dec 2020 19:21) (94% - 97%)                          10.0   8.56  )-----------( 611      ( 02 Dec 2020 11:03 )             33.7     12-02    139  |  104  |  35<H>  ----------------------------<  117<H>  4.6   |  28  |  1.19    Ca    9.2      02 Dec 2020 11:03    TPro  7.3  /  Alb  2.1<L>  /  TBili  0.4  /  DBili  x   /  AST  17  /  ALT  15  /  AlkPhos  121<H>  12-02    < from: CT Abdomen and Pelvis w/ IV Cont (12.02.20 @ 19:02) >  IMPRESSION:    Chronic urinary bladder outlet obstruction with additional evidence of cystitis. No definite evidence of ascending infection.    < end of copied text >      PHYSICAL EXAM  agitated, poor historian, h/o dementia  abd soft, NT/ND    ASSESSMENT/ PLAN:  as per primary team/staff, pt incontinent/voiding  cont to monitor for UO  tx UTI, abx per med, f/u cxs  serial BMP  may use condom cath

## 2020-12-02 NOTE — ED PROVIDER NOTE - CLINICAL SUMMARY MEDICAL DECISION MAKING FREE TEXT BOX
89 yo M with dysuria and purulent penile discharge. Patient with dementia. Hx obtained from daughter. Recent treatment with keflex and ceftin for UTI over the past two months. With UTI here today. Rocephin given. Will admit for further tx of UTI. Endorsed to Dr. Geller.

## 2020-12-02 NOTE — H&P ADULT - ASSESSMENT
89 y/o M pt from home bedbound have HHA 24/7 with a PMHx of Dementia presents to ED for reported greenish penile discharge by HHA. admitted for complicated UTI .

## 2020-12-02 NOTE — PATIENT PROFILE ADULT - LIFE CHALLENGES - DETAILS
patient with cognitive impairment. Unable to assesas patient with cognitive impairment. Unable to assess

## 2020-12-02 NOTE — H&P ADULT - HISTORY OF PRESENT ILLNESS
89 y/o M pt with a PMHx of Dementia presents to ED for reported greenish penile discharge. Pt unable to provide further Hx. As per family, patient noted to have dysuria and when home aid went to change diaper noticed purulent greenish discharge from diaper. Patient is bedbound. Denies fevers, nausea, emesis, chest pain, shortness of breath, abdominal pain, hematuria, diarrhea, constipation, or any other acute complaints. 89 y/o M pt from home bedbound with a PMHx of Dementia presents to ED for reported greenish penile discharge by HHA. Pt unable to provide further information. history was obtained from daughter  Swapna daughter ([phone]). reported Pt had a UTI in October and completed a course of Cephalexin 500mg BID but sx persisted in November and he went to urgent care where he was given Cefuroxime 500mg BID.  patient was c/o  dysuria and when home aid went to change diaper noticed purulent greenish discharge in diaper.     89 y/o M pt from home bedbound with a PMHx of Dementia presents to ED for reported greenish penile discharge by HHA. Pt unable to provide further information. history was obtained from daughter  Swapna daughter ([phone]). reported Pt had a UTI in October and completed a course of Cephalexin 500mg BID but sx persisted in November and he went to urgent care where he was given Cefuroxime 500mg BID.  patient was c/o  dysuria and when home aid went to change diaper noticed purulent greenish discharge in diaper.    ED course : pt noted ekaterina afebrile , wbc no elevated  have positive UA , lactate of 3.7 , was given Rocephin 1grX1 and 2 Liters NC , repeat lactate is 3. 3rd bolus was given 89 y/o M pt from home bedbound with a PMHx of Dementia presents to ED for reported greenish penile discharge by HHA. Pt AAOX0, unable to provide further information. history was obtained from daughter  Swapna daughter ([phone]). reported Pt had a UTI in October and completed a course of Cephalexin 500mg BID but sx persisted in November and he went to urgent care where he was given Cefuroxime 500mg BID.  patient was c/o  dysuria and when home aid went to change diaper noticed purulent greenish discharge in diaper.    ED course : pt noted ekaterina afebrile , wbc no elevated  have positive UA , lactate of 3.7 , was given Rocephin 1grX1 and 2 Liters NC , repeat lactate is 3. 3rd bolus was given 89 y/o M pt from home bedbound have HHA 24/7 with a PMHx of Dementia presents to ED for reported greenish penile discharge by HHA. Pt AAOX0, History was obtained from daughter  Swapna daughter ([phone]). reported Pt had a UTI in October and completed a course of Cephalexin 500mg BID but sx persisted in November and he went to urgent care where he was given Cefuroxime 500mg BID.  patient was c/o  dysuria, frequency  and when home aid went to change diaper noticed purulent greenish discharge in diaper. pt recently could not take solid food , lost 25 pounds in last 2 month. pt had a decline in cognitive function recently. denies fever , chills , chest pain, sob or any other acute complains.     ED course : Pt noted to be afebrile , wbc no elevated  have positive UA , lactate of 3.7 , was given Rocephin 1grX1 and 2 Liters NC , repeat lactate is 3. 3rd bolus was given.     GOC: DNI/DNR

## 2020-12-02 NOTE — ED ADULT NURSE REASSESSMENT NOTE - NS ED NURSE REASSESS COMMENT FT1
Pt received in stable and safe condition with IVF in progress, tolerating well, will continue to care for the pt.

## 2020-12-02 NOTE — ED PROVIDER NOTE - PROGRESS NOTE DETAILS
I spoke with Martina daughter ([phone]). Pt had a UTI in October and completed a course of Cephalexin 500mg BID but sx persisted in November and he went to urgent care where he was given Cefuroxime 500mg BID. Home aide went to change pt diaper today and noticed pus. Pt was also c/o dysuria. I spoke with Swapna daughter ([phone]). Pt had a UTI in October and completed a course of Cephalexin 500mg BID but sx persisted in November and he went to urgent care where he was given Cefuroxime 500mg BID. Home aide went to change pt diaper today and noticed pus. Pt was also c/o dysuria. Daughter notified and patient will be admitted for UTI.

## 2020-12-02 NOTE — ED PROVIDER NOTE - ENMT, MLM
Airway patent. Throat has no vesicles, no oropharyngeal exudates and uvula is midline. Dry mucosal membranes.

## 2020-12-02 NOTE — ED PROVIDER NOTE - OBJECTIVE STATEMENT
89 y/o M pt with a PMHx of Dementia presents to ED for reported greenish penile discharge. Pt unable to provide further Hx. Messages left for family. NKDA. 89 y/o M pt with a PMHx of Dementia presents to ED for reported greenish penile discharge. Pt unable to provide further Hx. As per family, patient noted to have dysuria and when home aid went to change diaper noticed purulent greenish discharge from diaper. Patient is bedbound. Denies fevers, nausea, emesis, chest pain, shortness of breath, abdominal pain, hematuria, diarrhea, constipation, or any other acute complaints.

## 2020-12-02 NOTE — H&P ADULT - PROBLEM SELECTOR PLAN 2
AAOX1 at baseline , currently agitated not giving any information   acute on chronic cognitive decline , could be in setting of UTI   not on any medication at home    Monitor Mental status

## 2020-12-03 NOTE — PROGRESS NOTE ADULT - ASSESSMENT
89 y/o bedbound male (HHA 24/7) with a PMHx of Dementia presents to ED c/o dysuria and frequency and reported greenish penile discharge from A.    1. Complicated UTI   C/W Meropenem given extensive UTI history. F/U urine culture. C/W Tamsulosin. Monitor I&O. Possible Marroquin catheter if retaining. Continue to monitor labs. ID consulted.     2. Dementia   Not on any medications at home. Monitor mental status.     3. Severe Protein- Calorie Malnutrition  Nutrition consulted. C/W puree diet.     DISPO: DVT Prophylaxis with 5000U Heparin SubQ

## 2020-12-03 NOTE — PROGRESS NOTE ADULT - SUBJECTIVE AND OBJECTIVE BOX
Pt seen at bedside  Patient is a 89y old  Male who presents with a chief complaint of Complicated UTI (03 Dec 2020 06:33)      INTERVAL HPI/OVERNIGHT EVENTS:  Pt denies abdominal pain  Voiding/incontinent per RN    Vital Signs Last 24 Hrs  T(C): 36.4 (03 Dec 2020 05:09), Max: 36.9 (02 Dec 2020 13:34)  T(F): 97.6 (03 Dec 2020 05:09), Max: 98.4 (02 Dec 2020 13:34)  HR: 66 (03 Dec 2020 05:09) (66 - 105)  BP: 121/94 (03 Dec 2020 05:09) (114/90 - 141/65)  BP(mean): --  RR: 18 (03 Dec 2020 05:09) (17 - 18)  SpO2: 96% (03 Dec 2020 05:09) (94% - 100%)    Physical Exam:    Gen: awake, alert not oriented NAD  Abd: soft NT ND, no suprapubic fullness/tenderness  Pelvic: nl external genitalia      MEDICATIONS  (STANDING):  dextrose 5% + sodium chloride 0.9%. 1000 milliLiter(s) (75 mL/Hr) IV Continuous <Continuous>  haloperidol    Injectable 2 milliGRAM(s) IV Push once  heparin   Injectable 5000 Unit(s) SubCutaneous every 8 hours  ketorolac   Injectable 15 milliGRAM(s) IV Push once  meropenem  IVPB      meropenem  IVPB 1000 milliGRAM(s) IV Intermittent every 12 hours  tamsulosin 0.4 milliGRAM(s) Oral at bedtime    MEDICATIONS  (PRN):      Labs:                          10.0   8.56  )-----------( 611      ( 02 Dec 2020 11:03 )             33.7     12-02    139  |  104  |  35<H>  ----------------------------<  117<H>  4.6   |  28  |  1.19    Ca    9.2      02 Dec 2020 11:03    TPro  7.3  /  Alb  2.1<L>  /  TBili  0.4  /  DBili  x   /  AST  17  /  ALT  15  /  AlkPhos  121<H>  12-02        I&O's Detail      Radiology:

## 2020-12-03 NOTE — PROGRESS NOTE ADULT - SUBJECTIVE AND OBJECTIVE BOX
Patient is a 89y old  Male who presents with a chief complaint of Complicated UTI (02 Dec 2020 21:41)    pt seen in icu [  ], reg med floor [   ], bed [  ], chair at bedside [   ], a+o x3 [  ], lethargic [  ],  nad [  ]    hobbs [  ], ngt [  ], peg [  ], et tube [  ], cent line [  ], picc line [  ]        Allergies    No Known Allergies        Vitals    T(F): 97.6 (12-03-20 @ 05:09), Max: 98.4 (12-02-20 @ 13:34)  HR: 66 (12-03-20 @ 05:09) (66 - 105)  BP: 121/94 (12-03-20 @ 05:09) (114/90 - 141/65)  RR: 18 (12-03-20 @ 05:09) (17 - 18)  SpO2: 96% (12-03-20 @ 05:09) (94% - 100%)  Wt(kg): --  CAPILLARY BLOOD GLUCOSE          Labs                          10.0   8.56  )-----------( 611      ( 02 Dec 2020 11:03 )             33.7       12-02    139  |  104  |  35<H>  ----------------------------<  117<H>  4.6   |  28  |  1.19    Ca    9.2      02 Dec 2020 11:03    TPro  7.3  /  Alb  2.1<L>  /  TBili  0.4  /  DBili  x   /  AST  17  /  ALT  15  /  AlkPhos  121<H>  12-02                Radiology Results      Meds    MEDICATIONS  (STANDING):  dextrose 5% + sodium chloride 0.9%. 1000 milliLiter(s) (75 mL/Hr) IV Continuous <Continuous>  haloperidol    Injectable 2 milliGRAM(s) IV Push once  heparin   Injectable 5000 Unit(s) SubCutaneous every 8 hours  ketorolac   Injectable 15 milliGRAM(s) IV Push once  meropenem  IVPB      meropenem  IVPB 1000 milliGRAM(s) IV Intermittent every 12 hours  tamsulosin 0.4 milliGRAM(s) Oral at bedtime      MEDICATIONS  (PRN):      Physical Exam    Neuro :  no focal deficits  Respiratory: CTA B/L  CV: RRR, S1S2, no murmurs,   Abdominal: Soft, NT, ND +BS,  Extremities: No edema, + peripheral pulses    ASSESSMENT    Urinary tract infection    Yes    No pertinent past medical history    Blind right eye    Dementia    No pertinent past medical history    No significant past surgical history    No significant past surgical history        PLAN     Patient is a 89y old  Male who presents with a chief complaint of Complicated UTI (02 Dec 2020 21:41)    pt seen in icu [  ], reg med/surg floor [ x  ], bed [ x ], chair at bedside [   ], awake and responsive [ x ], lethargic [  ],  nad [ x ]        Allergies    No Known Allergies        Vitals    T(F): 97.6 (12-03-20 @ 05:09), Max: 98.4 (12-02-20 @ 13:34)  HR: 66 (12-03-20 @ 05:09) (66 - 105)  BP: 121/94 (12-03-20 @ 05:09) (114/90 - 141/65)  RR: 18 (12-03-20 @ 05:09) (17 - 18)  SpO2: 96% (12-03-20 @ 05:09) (94% - 100%)  Wt(kg): --  CAPILLARY BLOOD GLUCOSE          Labs                          10.0   8.56  )-----------( 611      ( 02 Dec 2020 11:03 )             33.7       12-02    139  |  104  |  35<H>  ----------------------------<  117<H>  4.6   |  28  |  1.19    Ca    9.2      02 Dec 2020 11:03    TPro  7.3  /  Alb  2.1<L>  /  TBili  0.4  /  DBili  x   /  AST  17  /  ALT  15  /  AlkPhos  121<H>  12-02                Radiology Results      Meds    MEDICATIONS  (STANDING):  dextrose 5% + sodium chloride 0.9%. 1000 milliLiter(s) (75 mL/Hr) IV Continuous <Continuous>  haloperidol    Injectable 2 milliGRAM(s) IV Push once  heparin   Injectable 5000 Unit(s) SubCutaneous every 8 hours  ketorolac   Injectable 15 milliGRAM(s) IV Push once  meropenem  IVPB      meropenem  IVPB 1000 milliGRAM(s) IV Intermittent every 12 hours  tamsulosin 0.4 milliGRAM(s) Oral at bedtime      MEDICATIONS  (PRN):      Physical Exam    Neuro :  no focal deficits  Respiratory: CTA B/L  CV: RRR, S1S2, no murmurs,   Abdominal: Soft, NT, ND +BS,  Extremities: No edema, + peripheral pulses    ASSESSMENT    Urinary tract infection   sepsis   cystitis  chronic bladder outlet obstruction  h/o Blind right eye  Dementia            PLAN    cont merem to cover esbl   id cons  f/u blood and ucx  urology cons noted  pt incontinent/voiding  cont to monitor for UO   cont flomax  as of goc conversation 8/30/2018 :pt wants his daughter Swapna to speak for him, as surrogate.   palliative eval  cont current meds

## 2020-12-03 NOTE — CONSULT NOTE ADULT - CONVERSATION DETAILS
Spoke with patient's daughter Swapna by phone regarding current condition, overall goals of care. There are 7 siblings, she has been the one that usually addresses medical decisions, though they all discuss Discussed trajectory of dementia, advised patient with advanced dementia, hospice eligible. Hospice philosophy discussed. She expressed understanding and was in agreement that the patient has been declining Currently has 24h HHA. She is in agreement for referral for home hospice services. SW referral made. Confirmed DNR/DNI status, she prefers to allow for natural death. Discussed risks/benefits of artificial nutrition/PEG vs comfort feeds, she does not want feeding tubes. PAULO drafted for DNR/DNI, comfort measures, DNH, no artificial nutrition. Antibiotics ok. Support provided

## 2020-12-03 NOTE — DISCHARGE NOTE PROVIDER - HOSPITAL COURSE
89 y/o M pt from home bedbound have [facility] 24/7 with a PMHx of Dementia, axo x0 at baseline sent to ED for reported greenish penile discharge noticed  by [facility]. Pt was found to have elevated Lactate and positive UA, admitted to medicine for complicated UTI, started on Meropenem. ID Dr. Duffy consulted. CT of abdomen and pelvis was done and showed Chronic urinary bladder outlet obstruction with additional evidence of cystitis. No definite evidence of ascending infection. Urology followed- no acute interventions recommended   urine culture showed........................  NOT COMPLETE   Please note that this a brief summary of hospital course please refer to daily progress notes and consult notes for full course and events

## 2020-12-03 NOTE — CONSULT NOTE ADULT - ASSESSMENT
Patient is a 89y old  Male from home bedbound have [facility] 24/7 with a PMHx of Dementia brought in to  the ER for evaluation of greenish penile discharge by [facility]. Patient had a UTI in October and completed a course of Cephalexin 500mg BID but sx persisted in November and he went to urgent care where he was given Cefuroxime 500mg BID.  Patient was c/o  dysuria, frequency  and when home aid went to change diaper noticed purulent greenish discharge in diaper. pt recently could not take solid food , lost 25 pounds in last 2 month. He had a decline in cognitive function recently. ON admission, he found to have no fever or Leukocytosis, but positive Urine analysis. He has started on Meropenem and the ID consult requested to assist with further evaluation and antibiotic management.     # UTI    would recommend:    1. Follow up Urine culture  2. Continue Meropenem until work up is done  3. Monitor kidney function    will follow the patient with you and make further recommendation based on the clinical course and Lab results  Thank you for the opportunity to participate in Mr. BARBA's care      Attending Attestation:    Spent more than 65 minutes on total encounter, more than 50 % of the visit was spent counseling and/or coordinating care by the Attending physician.     Patient is a 89y old  Male from home bedbound have [facility] 24/7 with a PMHx of Dementia brought in to  the ER for evaluation of greenish penile discharge by [facility]. Patient had a UTI in October and completed a course of Cephalexin 500mg BID but sx persisted in November and he went to urgent care where he was given Cefuroxime 500mg BID.  Patient was c/o  dysuria, frequency  and when home aid went to change diaper noticed purulent greenish discharge in diaper. pt recently could not take solid food , lost 25 pounds in last 2 month. He had a decline in cognitive function recently. ON admission, he found to have no fever or Leukocytosis, but positive Urine analysis. He has started on Meropenem and the ID consult requested to assist with further evaluation and antibiotic management.     # UTI  # Metabolic encephalopathy     would recommend:    1. Follow up Urine culture  2. Continue Meropenem until work up is done  3. Monitor kidney function  4. Aspiration precaution  5. Please monitor kidney function and IVF    will follow the patient with you and make further recommendation based on the clinical course and Lab results  Thank you for the opportunity to participate in Mr. BARBA's care      Attending Attestation:    Spent more than 65 minutes on total encounter, more than 50 % of the visit was spent counseling and/or coordinating care by the Attending physician.     Patient is a 89y old  Male from home bedbound have [facility] 24/7 with a PMHx of Dementia brought in to  the ER for evaluation of greenish penile discharge by [facility]. Patient had a UTI in October and completed a course of Cephalexin 500mg BID but sx persisted in November and he went to urgent care where he was given Cefuroxime 500mg BID.  Patient was c/o  dysuria, frequency  and when home aid went to change diaper noticed purulent greenish discharge in diaper. pt recently could not take solid food , lost 25 pounds in last 2 month. He had a decline in cognitive function recently. ON admission, he found to have no fever or Leukocytosis, but positive Urine analysis. He has started on Meropenem and the ID consult requested to assist with further evaluation and antibiotic management.     # UTI  # Metabolic encephalopathy     would recommend:    1. Follow up Urine culture  2. Continue Meropenem until work up is done  3. Monitor kidney function  4. Aspiration precaution  5. Please monitor kidney function and IVF    d/w Covering Np, Anurag     will follow the patient with you and make further recommendation based on the clinical course and Lab results  Thank you for the opportunity to participate in Mr. BARBA's care      Attending Attestation:    Spent more than 65 minutes on total encounter, more than 50 % of the visit was spent counseling and/or coordinating care by the Attending physician.

## 2020-12-03 NOTE — CONSULT NOTE ADULT - SUBJECTIVE AND OBJECTIVE BOX
Patient is a 89y old  Male from home bedbound have [facility]  with a PMHx of Dementia brought in to  the ER for evaluation of greenish penile discharge by [facility]. Patient had a UTI in October and completed a course of Cephalexin 500mg BID but sx persisted in November and he went to urgent care where he was given Cefuroxime 500mg BID.  Patient was c/o  dysuria, frequency  and when home aid went to change diaper noticed purulent greenish discharge in diaper. pt recently could not take solid food , lost 25 pounds in last 2 month. He had a decline in cognitive function recently. ON admission, he found to have no fever or Leukocytosis, but positive Urine analysis. He has started on Meropenem and the ID consult requested to assist with further evaluation and antibiotic management.         REVIEW OF SYSTEMS: Total of twelve systems have been reviewed  and found to be negative unless mentioned in HPI        PAST MEDICAL & SURGICAL HISTORY:  Blind right eye  Dementia  No significant past surgical history      SOCIAL HISTORY  Alcohol: Does not drink  Tobacco: Does not smoke  Illicit substance use: None      FAMILY HISTORY: Non contributory to the present illness      ALLERGIES: No Known Allergies      Vital Signs Last 24 Hrs  T(C): 36.4 (03 Dec 2020 05:09), Max: 36.9 (02 Dec 2020 13:34)  T(F): 97.6 (03 Dec 2020 05:09), Max: 98.4 (02 Dec 2020 13:34)  HR: 66 (03 Dec 2020 05:09) (66 - 105)  BP: 121/94 (03 Dec 2020 05:09) (114/90 - 141/65)  BP(mean): --  RR: 18 (03 Dec 2020 05:09) (17 - 18)  SpO2: 96% (03 Dec 2020 05:09) (94% - 100%)      PHYSICAL EXAM:  GENERAL: Not in distress   CHEST/LUNG: Not using accessory muscles   HEART: s1 and s2 present  ABDOMEN:  Nontender and  Nondistended  EXTREMITIES: No pedal  edema  CNS: Awake and Alert      LABS:                        8.7    7.06  )-----------( 545      ( 03 Dec 2020 09:27 )             30.1       12-03    144  |  112<H>  |  29<H>  ----------------------------<  95  3.9   |  23  |  1.07    Ca    8.3<L>      03 Dec 2020 09:27    TPro  6.0  /  Alb  1.7<L>  /  TBili  0.3  /  DBili  x   /  AST  19  /  ALT  15  /  AlkPhos  94  12-03        Urinalysis Basic - ( 02 Dec 2020 10:37 )  Color: Yellow / Appearance: Slightly Turbid / S.015 / pH: x  Gluc: x / Ketone: Negative  / Bili: Negative / Urobili: 1   Blood: x / Protein: 100 / Nitrite: Positive   Leuk Esterase: Moderate / RBC: 0-2 /HPF / WBC >50 /HPF   Sq Epi: x / Non Sq Epi: x / Bacteria: Many /HPF        MEDICATIONS  (STANDING):  dextrose 5% + sodium chloride 0.9%. 1000 milliLiter(s) (75 mL/Hr) IV Continuous <Continuous>  haloperidol    Injectable 2 milliGRAM(s) IV Push once  heparin   Injectable 5000 Unit(s) SubCutaneous every 8 hours  ketorolac   Injectable 15 milliGRAM(s) IV Push once  meropenem  IVPB      meropenem  IVPB 1000 milliGRAM(s) IV Intermittent every 12 hours  tamsulosin 0.4 milliGRAM(s) Oral at bedtime    MEDICATIONS  (PRN):      RADIOLOGY & ADDITIONAL TESTS:    20 : CT Abdomen and Pelvis w/ IV Cont (20 @ 19:02) Chronic urinary bladder outlet obstruction with additional evidence of cystitis. No definite evidence of ascending infection.                 Patient is a 89y old  Male from home bedbound have [facility]  with a PMHx of Dementia brought in to  the ER for evaluation of greenish penile discharge by [facility]. Patient had a UTI in October and completed a course of Cephalexin 500mg BID but sx persisted in November and he went to urgent care where he was given Cefuroxime 500mg BID.  Patient was c/o  dysuria, frequency  and when home aid went to change diaper noticed purulent greenish discharge in diaper. pt recently could not take solid food , lost 25 pounds in last 2 month. He had a decline in cognitive function recently. ON admission, he found to have no fever or Leukocytosis, but positive Urine analysis. He has started on Meropenem and the ID consult requested to assist with further evaluation and antibiotic management.         REVIEW OF SYSTEMS: Total of twelve systems have been reviewed  and found to be negative unless mentioned in HPI        PAST MEDICAL & SURGICAL HISTORY:  Blind right eye  Dementia  No significant past surgical history      SOCIAL HISTORY  Alcohol: Does not drink  Tobacco: Does not smoke  Illicit substance use: None      FAMILY HISTORY: Non contributory to the present illness      ALLERGIES: No Known Allergies      Vital Signs Last 24 Hrs  T(C): 36.4 (03 Dec 2020 05:09), Max: 36.9 (02 Dec 2020 13:34)  T(F): 97.6 (03 Dec 2020 05:09), Max: 98.4 (02 Dec 2020 13:34)  HR: 66 (03 Dec 2020 05:09) (66 - 105)  BP: 121/94 (03 Dec 2020 05:09) (114/90 - 141/65)  BP(mean): --  RR: 18 (03 Dec 2020 05:09) (17 - 18)  SpO2: 96% (03 Dec 2020 05:09) (94% - 100%)      PHYSICAL EXAM:  GENERAL: Not in distress   CHEST/LUNG: Not using accessory muscles   HEART: s1 and s2 present  ABDOMEN:  Nontender and  Nondistended  EXTREMITIES: No pedal  edema  CNS: Awake and ?? Alert      LABS:                        8.7    7.06  )-----------( 545      ( 03 Dec 2020 09:27 )             30.1       12-03    144  |  112<H>  |  29<H>  ----------------------------<  95  3.9   |  23  |  1.07    Ca    8.3<L>      03 Dec 2020 09:27    TPro  6.0  /  Alb  1.7<L>  /  TBili  0.3  /  DBili  x   /  AST  19  /  ALT  15  /  AlkPhos  94  12-03        Urinalysis Basic - ( 02 Dec 2020 10:37 )  Color: Yellow / Appearance: Slightly Turbid / S.015 / pH: x  Gluc: x / Ketone: Negative  / Bili: Negative / Urobili: 1   Blood: x / Protein: 100 / Nitrite: Positive   Leuk Esterase: Moderate / RBC: 0-2 /HPF / WBC >50 /HPF   Sq Epi: x / Non Sq Epi: x / Bacteria: Many /HPF        MEDICATIONS  (STANDING):  dextrose 5% + sodium chloride 0.9%. 1000 milliLiter(s) (75 mL/Hr) IV Continuous <Continuous>  haloperidol    Injectable 2 milliGRAM(s) IV Push once  heparin   Injectable 5000 Unit(s) SubCutaneous every 8 hours  ketorolac   Injectable 15 milliGRAM(s) IV Push once  meropenem  IVPB      meropenem  IVPB 1000 milliGRAM(s) IV Intermittent every 12 hours  tamsulosin 0.4 milliGRAM(s) Oral at bedtime    MEDICATIONS  (PRN):      RADIOLOGY & ADDITIONAL TESTS:    20 : CT Abdomen and Pelvis w/ IV Cont (20 @ 19:02) Chronic urinary bladder outlet obstruction with additional evidence of cystitis. No definite evidence of ascending infection.      MICROBIOLOGY DATA:    COVID-19 PCR . (20 @ 13:32)   COVID-19 PCR: NotDetec:

## 2020-12-03 NOTE — DISCHARGE NOTE PROVIDER - NSDCCPCAREPLAN_GEN_ALL_CORE_FT
PRINCIPAL DISCHARGE DIAGNOSIS  Diagnosis: UTI (urinary tract infection)  Assessment and Plan of Treatment: you were admitted to hospital for complicated  UTI. you were started on IV antibiotics.   continue medications as prescribed   Follow up with PCP within 1 week   SEEK IMMEDIATE MEDICAL CARE IF:  You have severe back pain or lower abdominal pain.  You develop chills.  You have nausea or vomiting.  You have continued burning or discomfort with urination.        SECONDARY DISCHARGE DIAGNOSES  Diagnosis: Dementia  Assessment and Plan of Treatment: patient needs assitance with all activities   continue supportive care

## 2020-12-03 NOTE — PROGRESS NOTE ADULT - PROBLEM SELECTOR PLAN 3
-dvt ppx- cont Lovenox -likely in setting of advanced dementia   -cont pureed diet   -nutrition consult

## 2020-12-03 NOTE — CONSULT NOTE ADULT - PROBLEM SELECTOR RECOMMENDATION 2
2/2 comorbidities, comfort feeds as tolerated, family does not want feeding tubes. Albumin 1.7. High risk for skin failure.

## 2020-12-03 NOTE — CONSULT NOTE ADULT - PROBLEM SELECTOR RECOMMENDATION 4
GOC discussion as above. DNR/DNI. hospice eligible, family in agreement for home hospice. SW referral made.

## 2020-12-03 NOTE — PROGRESS NOTE ADULT - ASSESSMENT
89 y/o M pt from home bedbound have [facility] 24/7 with a PMHx of Dementia, axo x0 at baseline sent to ED for reported greenish penile discharge noticed  by [facility]. Pt was found to have elevated Lactate and positive UA, admitted to medicine for complicated UTI, started on Meropenem. ID Dr. Duffy consulted

## 2020-12-03 NOTE — PROGRESS NOTE ADULT - SUBJECTIVE AND OBJECTIVE BOX
BRIEF HOSPITAL COURSE-  89 y/o bedbound male ([facility] ) with a PMHx of Dementia presents to ED c/o dysuria and frequency and reported greenish penile discharge from [facility]. History was obtained from daughter Swapna daughter ([phone]). Per daughter patient had a UTI in October and completed a course of Cephalexin 500mg BID. Symptoms persisted in November and he was brought to urgent care where he was given Cefuroxime 500mg BID. Daughter admits to recent weight lose of 25 pounds in last 2 months, inability to tolerate solid food, and acute decline in cognitive function. Denies fever, chills, chest pain, SOB, abdominal pain, or back pain.     OVERNIGHT EVENTS-  No acute events reported. Patient seen and examined at the bedside. Remains very altered and confused. Voiding with assist using the urinal. Remains incontinent at times. Lactate downtrending. Remains afebrile. Denies abdominal pain, painful urination, SOB, chest pain, chills, and back pain.     PAST MEDICAL & SURGICAL HISTORY-  Blind right eye  Dementia  No significant past surgical history    MEDICATIONS (STANDING):  dextrose 5% + sodium chloride 0.9%. 1000 milliLiter(s) (75 mL/Hr) IV Continuous <Continuous>  haloperidol    Injectable 2 milliGRAM(s) IV Push once  heparin   Injectable 5000 Unit(s) SubCutaneous every 8 hours  ketorolac   Injectable 15 milliGRAM(s) IV Push once  meropenem  IVPB      meropenem  IVPB 1000 milliGRAM(s) IV Intermittent every 12 hours  tamsulosin 0.4 milliGRAM(s) Oral at bedtime    Allergies-  No Known Allergies    REVIEW OF SYSTEMS-  Unable to obtain due to patients status.     ICU Vital Signs Last 24 Hrs  T(C): 36.4 (03 Dec 2020 05:09), Max: 36.9 (02 Dec 2020 13:34)  T(F): 97.6 (03 Dec 2020 05:09), Max: 98.4 (02 Dec 2020 13:34)  HR: 66 (03 Dec 2020 05:09) (66 - 105)  BP: 121/94 (03 Dec 2020 05:09) (114/90 - 141/65)  RR: 18 (03 Dec 2020 05:09) (17 - 18)  SpO2: 96% (03 Dec 2020 05:09) (94% - 100%)    PHYSICAL EXAM-  Constitutional: Cachetic, awake and alert.   Eyes: PERRLA. EOMS intact.  Respiratory: Normal vesicular breath sounds. No wheezes, rales or rhonchi.   Cardiovascular: Regular rate and rhythm S1, S2 intact.   Gastrointestinal: Soft, non-distended, non-tender. Normoactive bowel sounds. No rebound tenderness or guarding.   Genitourinary: Normal external genitalia. No suprapubic fullness/tenderness.  Extremities: No edema, varicosities or cyanosis B/L.   Neurological: AOX2. Calm but unable to answer all question appropriately due to mentation.   Skin: Warm and dry. No rashes noted. Diffuse ecchymosis on B/L upper extremities.     LABS-                8.7    7.06  )-----------( 545      ( 03 Dec 2020 09:27 )             30.1     12-03  144  |  112<H>  |  29<H>  ----------------------------<  95  3.9   |  23  |  1.07    Ca    8.3<L>      03 Dec 2020 09:27    TPro  6.0  /  Alb  1.7<L>  /  TBili  0.3  /  DBili  x   /  AST  19  /  ALT  15  /  AlkPhos  94  12-03    Urinalysis Basic - ( 02 Dec 2020 10:37 )  Color: Yellow / Appearance: Slightly Turbid / S.015 / pH: x  Gluc: x / Ketone: Negative  / Bili: Negative / Urobili: 1   Blood: x / Protein: 100 / Nitrite: Positive   Leuk Esterase: Moderate / RBC: 0-2 /HPF / WBC >50 /HPF   Sq Epi: x / Non Sq Epi: x / Bacteria: Many /HPF    CT Abdomen and Pelvis w/ IV Contrast-  Chronic urinary bladder outlet obstruction with additional evidence of cystitis. No definite evidence of ascending infection. 20 Actual Care provided at 07:15.     BRIEF HOSPITAL COURSE-  89 y/o bedbound male ([facility] 24/7) with a PMHx of Dementia presents to ED c/o dysuria and frequency and reported greenish penile discharge from [facility]. History was obtained from daughter Swapna daughter ([phone]). Per daughter patient had a UTI in October and completed a course of Cephalexin 500mg BID. Symptoms persisted in November and he was brought to urgent care where he was given Cefuroxime 500mg BID. Daughter admits to recent weight lose of 25 pounds in last 2 months, inability to tolerate solid food, and acute decline in cognitive function. Denies fever, chills, chest pain, SOB, abdominal pain, or back pain.     OVERNIGHT EVENTS-  No acute events reported. Patient seen and examined at the bedside. Remains very altered and confused. Voiding with assist using the urinal. Remains incontinent at times. Lactate downtrending. Remains afebrile. Denies abdominal pain, painful urination, SOB, chest pain, chills, and back pain.     PAST MEDICAL & SURGICAL HISTORY-  Blind right eye  Dementia  No significant past surgical history    MEDICATIONS (STANDING):  dextrose 5% + sodium chloride 0.9%. 1000 milliLiter(s) (75 mL/Hr) IV Continuous <Continuous>  haloperidol    Injectable 2 milliGRAM(s) IV Push once  heparin   Injectable 5000 Unit(s) SubCutaneous every 8 hours  ketorolac   Injectable 15 milliGRAM(s) IV Push once  meropenem  IVPB      meropenem  IVPB 1000 milliGRAM(s) IV Intermittent every 12 hours  tamsulosin 0.4 milliGRAM(s) Oral at bedtime    Allergies-  No Known Allergies    REVIEW OF SYSTEMS-  Unable to obtain due to patients status.     ICU Vital Signs Last 24 Hrs  T(C): 36.4 (03 Dec 2020 05:09), Max: 36.9 (02 Dec 2020 13:34)  T(F): 97.6 (03 Dec 2020 05:09), Max: 98.4 (02 Dec 2020 13:34)  HR: 66 (03 Dec 2020 05:09) (66 - 105)  BP: 121/94 (03 Dec 2020 05:09) (114/90 - 141/65)  RR: 18 (03 Dec 2020 05:09) (17 - 18)  SpO2: 96% (03 Dec 2020 05:09) (94% - 100%)    PHYSICAL EXAM-  Constitutional: Cachetic, awake and alert.   Eyes: PERRLA. EOMS intact.  Respiratory: Normal vesicular breath sounds. No wheezes, rales or rhonchi.   Cardiovascular: Regular rate and rhythm S1, S2 intact.   Gastrointestinal: Soft, non-distended, non-tender. Normoactive bowel sounds. No rebound tenderness or guarding.   Genitourinary: Normal external genitalia. No suprapubic fullness/tenderness.  Extremities: No edema, varicosities or cyanosis B/L.   Neurological: AOX2. Calm but unable to answer all question appropriately due to mentation.   Skin: Warm and dry. No rashes noted. Diffuse ecchymosis on B/L upper extremities.     LABS-                8.7    7.06  )-----------( 545      ( 03 Dec 2020 09:27 )             30.1     12-03  144  |  112<H>  |  29<H>  ----------------------------<  95  3.9   |  23  |  1.07    Ca    8.3<L>      03 Dec 2020 09:27    TPro  6.0  /  Alb  1.7<L>  /  TBili  0.3  /  DBili  x   /  AST  19  /  ALT  15  /  AlkPhos  94  12-03    Urinalysis Basic - ( 02 Dec 2020 10:37 )  Color: Yellow / Appearance: Slightly Turbid / S.015 / pH: x  Gluc: x / Ketone: Negative  / Bili: Negative / Urobili: 1   Blood: x / Protein: 100 / Nitrite: Positive   Leuk Esterase: Moderate / RBC: 0-2 /HPF / WBC >50 /HPF   Sq Epi: x / Non Sq Epi: x / Bacteria: Many /HPF    CT Abdomen and Pelvis w/ IV Contrast-  Chronic urinary bladder outlet obstruction with additional evidence of cystitis. No definite evidence of ascending infection.

## 2020-12-03 NOTE — PROGRESS NOTE ADULT - PROBLEM SELECTOR PLAN 4
-DNR/DNI -needs assistance with all ADLs   -frequent turn and positions   -aspiration precautions   -supportive care

## 2020-12-03 NOTE — CONSULT NOTE ADULT - SUBJECTIVE AND OBJECTIVE BOX
HPI:  89 y/o M pt from home bedbound have HHA  with a PMHx of Dementia presents to ED for reported greenish penile discharge by HHA. Pt AAOX0, History was obtained from daughter  Swapna daughter ([phone]). reported Pt had a UTI in October and completed a course of Cephalexin 500mg BID but sx persisted in November and he went to urgent care where he was given Cefuroxime 500mg BID.  patient was c/o  dysuria, frequency  and when home aid went to change diaper noticed purulent greenish discharge in diaper. pt recently could not take solid food , lost 25 pounds in last 2 month. pt had a decline in cognitive function recently. denies fever , chills , chest pain, sob or any other acute complains.     ED course : Pt noted to be afebrile , wbc no elevated  have positive UA , lactate of 3.7 , was given Rocephin 1grX1 and 2 Liters NC , repeat lactate is 3. 3rd bolus was given.     GOC: DNI/DNR   (02 Dec 2020 13:35)      PAST MEDICAL & SURGICAL HISTORY:  Blind right eye    Dementia    No significant past surgical history        SOCIAL HISTORY:    Admitted from:  home assisted living [facility]   Substance abuse history:              Tobacco hx:                  Alcohol hx:              Home Opioid hx:  Adventism:                                    Preferred Language:    Surrogate/HCP/Guardian:            Phone#:    FAMILY HISTORY:  No pertinent family history in first degree relatives      Baseline ADLs (prior to admission):    Allergies    No Known Allergies    Intolerances      Present Symptoms:   Dyspnea:   Nausea/Vomiting:   Anxiety:  Depressed   Fatigue:  Loss of appetite:   Pain:                                location:          Review of Systems: [All others negative or Unable to obtain due to poor mentation]    MEDICATIONS  (STANDING):  dextrose 5% + sodium chloride 0.9%. 1000 milliLiter(s) (75 mL/Hr) IV Continuous <Continuous>  haloperidol    Injectable 2 milliGRAM(s) IV Push once  heparin   Injectable 5000 Unit(s) SubCutaneous every 8 hours  ketorolac   Injectable 15 milliGRAM(s) IV Push once  meropenem  IVPB      meropenem  IVPB 1000 milliGRAM(s) IV Intermittent every 12 hours  tamsulosin 0.4 milliGRAM(s) Oral at bedtime    MEDICATIONS  (PRN):      PHYSICAL EXAM:    Vital Signs Last 24 Hrs  T(C): 36.4 (03 Dec 2020 13:01), Max: 36.4 (02 Dec 2020 22:30)  T(F): 97.6 (03 Dec 2020 13:01), Max: 97.6 (03 Dec 2020 05:09)  HR: 87 (03 Dec 2020 13:01) (66 - 105)  BP: 120/75 (03 Dec 2020 13:01) (114/90 - 121/94)  BP(mean): --  RR: 17 (03 Dec 2020 13:01) (17 - 18)  SpO2: 96% (03 Dec 2020 13:) (96% - 100%)    General: alert  oriented x ____    [lethargic distressed cachexia  nonverbal  unarousable verbal]  Karnofsky Performance Score/Palliative Performance Status Version2:     %    HEENT: normal  dry mouth  ET tube/trach oral lesions:  Lungs: comfortable tachypnea/labored breathing  excessive secretions  CV: normal  tachycardia  GI: normal  distended  tender  incontinent               PEG/NG/OG tube  constipation  last BM:   : normal  incontinent  oliguria/anuria  hobbs  Musculoskeletal: normal  weakness  edema             ambulatory  bedbound/wheelchair bound  Skin: normal  pressure ulcers: stage: edema: other:  Neuro: no deficits cognitive impairment dsyphagia/dysarthria paresis: other:  Oral intake ability: unable/only mouth care [minimal moderate full capability]  Diet: [NPO]    LABS:                        8.7    7.06  )-----------( 545      ( 03 Dec 2020 09:27 )             30.1     12-03    144  |  112<H>  |  29<H>  ----------------------------<  95  3.9   |  23  |  1.07    Ca    8.3<L>      03 Dec 2020 09:27    TPro  6.0  /  Alb  1.7<L>  /  TBili  0.3  /  DBili  x   /  AST  19  /  ALT  15  /  AlkPhos  94  12-03    Urinalysis Basic - ( 02 Dec 2020 10:37 )    Color: Yellow / Appearance: Slightly Turbid / S.015 / pH: x  Gluc: x / Ketone: Negative  / Bili: Negative / Urobili: 1   Blood: x / Protein: 100 / Nitrite: Positive   Leuk Esterase: Moderate / RBC: 0-2 /HPF / WBC >50 /HPF   Sq Epi: x / Non Sq Epi: x / Bacteria: Many /HPF        RADIOLOGY & ADDITIONAL STUDIES:    ADVANCE DIRECTIVES:    HPI:  89 y/o M pt from home bedbound have HHA  with a PMHx of Dementia presents to ED for reported greenish penile discharge by HHA. Pt AAOX0, History was obtained from daughter  Swapna daughter ([phone]). reported Pt had a UTI in October and completed a course of Cephalexin 500mg BID but sx persisted in November and he went to urgent care where he was given Cefuroxime 500mg BID.  patient was c/o  dysuria, frequency  and when home aid went to change diaper noticed purulent greenish discharge in diaper. pt recently could not take solid food , lost 25 pounds in last 2 month. pt had a decline in cognitive function recently. denies fever , chills , chest pain, sob or any other acute complains.     ED course : Pt noted to be afebrile , wbc no elevated  have positive UA , lactate of 3.7 , was given Rocephin 1grX1 and 2 Liters NC , repeat lactate is 3. 3rd bolus was given.     GOC: DNI/DNR   (02 Dec 2020 13:35)    Interval history: A&Ox1, NAD. DNR/DNI on file.       PAST MEDICAL & SURGICAL HISTORY:  Blind right eye    Dementia    No significant past surgical history        SOCIAL HISTORY:  has 7 children  Admitted from:  home     Preferred Language: Micronesian    Surrogate/HCP/Guardian:     Swapna Foy      Phone#:[phone]    FAMILY HISTORY:  No pertinent family history in first degree relatives      Baseline ADLs (prior to admission): confused, nonambulatory    Allergies    No Known Allergies    Intolerances      Present Symptoms:            Review of Systems:   Unable to obtain due to poor mentation     MEDICATIONS  (STANDING):  dextrose 5% + sodium chloride 0.9%. 1000 milliLiter(s) (75 mL/Hr) IV Continuous <Continuous>  haloperidol    Injectable 2 milliGRAM(s) IV Push once  heparin   Injectable 5000 Unit(s) SubCutaneous every 8 hours  ketorolac   Injectable 15 milliGRAM(s) IV Push once  meropenem  IVPB      meropenem  IVPB 1000 milliGRAM(s) IV Intermittent every 12 hours  tamsulosin 0.4 milliGRAM(s) Oral at bedtime    MEDICATIONS  (PRN):      PHYSICAL EXAM:    Vital Signs Last 24 Hrs  T(C): 36.4 (03 Dec 2020 13:01), Max: 36.4 (02 Dec 2020 22:30)  T(F): 97.6 (03 Dec 2020 13:01), Max: 97.6 (03 Dec 2020 05:09)  HR: 87 (03 Dec 2020 13:01) (66 - 105)  BP: 120/75 (03 Dec 2020 13:01) (114/90 - 121/94)  BP(mean): --  RR: 17 (03 Dec 2020 13:) (17 - 18)  SpO2: 96% (03 Dec 2020 13:) (96% - 100%)     Karnofsky Performance Score/Palliative Performance Status Version2:   30 %     GENERAL: alert, confused, cachectic,  NAD  HEENT: Atraumatic, oropharynx clear, neck supple  CHEST/LUNG: unlabored  HEART: Regular rate and rhythm    ABDOMEN: Soft, Nontender, Nondistended   MUSCULOSKELETAL:  No  edema,  bedbound  NERVOUS SYSTEM:  confused, does not follow commands  SKIN: No rashes or lesions noted  Oral intake: poor       LABS:                        8.7    7.06  )-----------( 545      ( 03 Dec 2020 09:27 )             30.1     12-03    144  |  112<H>  |  29<H>  ----------------------------<  95  3.9   |  23  |  1.07    Ca    8.3<L>      03 Dec 2020 09:27    TPro  6.0  /  Alb  1.7<L>  /  TBili  0.3  /  DBili  x   /  AST  19  /  ALT  15  /  AlkPhos  94  12-03    Urinalysis Basic - ( 02 Dec 2020 10:37 )    Color: Yellow / Appearance: Slightly Turbid / S.015 / pH: x  Gluc: x / Ketone: Negative  / Bili: Negative / Urobili: 1   Blood: x / Protein: 100 / Nitrite: Positive   Leuk Esterase: Moderate / RBC: 0-2 /HPF / WBC >50 /HPF   Sq Epi: x / Non Sq Epi: x / Bacteria: Many /HPF        RADIOLOGY & ADDITIONAL STUDIES:    ADVANCE DIRECTIVES:

## 2020-12-03 NOTE — DISCHARGE NOTE PROVIDER - CARE PROVIDER_API CALL
Mathieu Yates)  Internal Medicine  [address] [place], [place] [address]  Phone: [phone]  Fax: [phone]  Follow Up Time: 1 week

## 2020-12-04 NOTE — PROGRESS NOTE ADULT - SUBJECTIVE AND OBJECTIVE BOX
BRIEF HOSPITAL COURSE-  89 y/o bedbound male ([facility] ) with a PMHx of Dementia presents to ED c/o dysuria and frequency and reported greenish penile discharge from [facility]. History was obtained from daughter Swapna daughter ([phone]). Per daughter patient had a UTI in October and completed a course of Cephalexin 500mg BID. Symptoms persisted in November and he was brought to urgent care where he was given Cefuroxime 500mg BID. Daughter admits to recent weight lose of 25 pounds in last 2 months, inability to tolerate solid food, and acute decline in cognitive function. Denies fever, chills, chest pain, SOB, abdominal pain, or back pain.     OVERNIGHT EVENTS-  No acute events reported. Patient seen and examined at the bedside. Remains very altered and confused. Increasingly agitated this morning. Voiding with assist using the urinal. Remains incontinent at times. Lactate downtrending. Remains afebrile. WBC remains normal. Denies abdominal pain, painful urination, SOB, chest pain, chills, and back pain.     PAST MEDICAL & SURGICAL HISTORY-  Blind right eye  Dementia  No significant past surgical history    MEDICATIONS  (STANDING):  dextrose 5% + sodium chloride 0.9%. 1000 milliLiter(s) (75 mL/Hr) IV Continuous <Continuous>  heparin Injectable 5000 Unit(s) SubCutaneous every 8 hours  ketorolac Injectable 15 milliGRAM(s) IV Push once  meropenem IVPB      meropenem IVPB 1000 milliGRAM(s) IV Intermittent every 12 hours  tamsulosin 0.4 milliGRAM(s) Oral at bedtime    Allergies-  No Known Allergies    REVIEW OF SYSTEMS-  Unable to obtain due to patients status.     ICU Vital Signs Last 24 Hrs  T(C): 36.4 (04 Dec 2020 14:57), Max: 36.6 (03 Dec 2020 22:48)  T(F): 97.6 (04 Dec 2020 14:57), Max: 97.8 (03 Dec 2020 22:48)  HR: 89 (04 Dec 2020 14:57) (88 - 104)  BP: 87/59 (04 Dec 2020 14:57) (87/59 - 125/82)  RR: 18 (04 Dec 2020 14:57) (17 - 18)  SpO2: 95% (04 Dec 2020 14:57) (95% - 100%)    PHYSICAL EXAM-  Constitutional: Severely cachetic. Awake and alert.  Eyes: PERRLA. EOMs intact.  Respiratory: Normal vesicular breath sounds. No wheezes, rales or rhonchi  Cardiovascular: Regular rate and rhythm   Gastrointestinal: Soft, non-distended, non-tender. Normoactive bowel sounds. No rebound tenderness or guarding.   Genitourinary: Normal external genitalia. No suprapubic fullness/tenderness.  Extremities: No edema, varicosities or cyanosis B/L.   Neurological: AOX2. No focal neuro deficits.  Skin: Warm and dry. No rashes noted. Diffuse ecchymosis on B/L upper extremities  Psychiatric: Awake, agitated and unable to answer questions appropriately due to mentation.     Labs-  148<H>  |  116<H>  |  24<H>  ----------------------------<  85  3.4<L>   |  24  |  0.86    Ca    8.5      04 Dec 2020 06:26    TPro  6.0  /  Alb  1.7<L>  /  TBili  0.3  /  DBili  x   /  AST  19  /  ALT  15  /  AlkPhos  94  12-03               9.4    5.91  )-----------( 505      ( 04 Dec 2020 06:26 )             31.1     Urinalysis Basic - ( 02 Dec 2020 10:37 )  Color: Yellow / Appearance: Slightly Turbid / S.015 / pH: x  Gluc: x / Ketone: Negative  / Bili: Negative / Urobili: 1   Blood: x / Protein: 100 / Nitrite: Positive   Leuk Esterase: Moderate / RBC: 0-2 /HPF / WBC >50 /HPF     Sq Epi: x / Non Sq Epi: x / Bacteria: Many /HPF    CT Abdomen and Pelvis w/ IV Contrast-  Chronic urinary bladder outlet obstruction with additional evidence of cystitis. No definite evidence of ascending infection.    Urine Culture-   >100,000 CFU/ml Gram Negative Rods  >100,000 CFU/ml Morganella morganii (20 @ 19:11)

## 2020-12-04 NOTE — PROGRESS NOTE ADULT - SUBJECTIVE AND OBJECTIVE BOX
Patient is a 89y old  Male who presents with a chief complaint of Complicated UTI (04 Dec 2020 08:14)    OVERNIGHT EVENTS: no acute events overnight       REVIEW OF SYSTEMS: LUIS sec to mental status     T(C): 36.3 (20 @ 05:15), Max: 36.6 (20 @ 22:48)  HR: 104 (20 @ 05:15) (87 - 104)  BP: 116/53 (20 @ 05:15) (116/53 - 125/82)  RR: 18 (20 @ 05:15) (17 - 18)  SpO2: 100% (20 @ 05:15) (96% - 100%)  Wt(kg): --Vital Signs Last 24 Hrs  T(C): 36.3 (04 Dec 2020 05:15), Max: 36.6 (03 Dec 2020 22:48)  T(F): 97.3 (04 Dec 2020 05:15), Max: 97.8 (03 Dec 2020 22:48)  HR: 104 (04 Dec 2020 05:15) (87 - 104)  BP: 116/53 (04 Dec 2020 05:15) (116/53 - 125/82)  BP(mean): --  RR: 18 (04 Dec 2020 05:15) (17 - 18)  SpO2: 100% (04 Dec 2020 05:15) (96% - 100%)    MEDICATIONS  (STANDING):  dextrose 5% + sodium chloride 0.9%. 1000 milliLiter(s) (75 mL/Hr) IV Continuous <Continuous>  haloperidol    Injectable 2 milliGRAM(s) IV Push once  heparin   Injectable 5000 Unit(s) SubCutaneous every 8 hours  ketorolac   Injectable 15 milliGRAM(s) IV Push once  meropenem  IVPB      meropenem  IVPB 1000 milliGRAM(s) IV Intermittent every 12 hours  tamsulosin 0.4 milliGRAM(s) Oral at bedtime    MEDICATIONS  (PRN):      PHYSICAL EXAM:  GENERAL: NAD, cachectic   EYES: clear conjunctiva  ENMT: Moist mucous membranes  NECK: Supple, No JVD  CHEST/LUNG: Clear to auscultation bilaterally; No rales, rhonchi, wheezing, or rubs  HEART: S1, S2, Regular rate and rhythm  ABDOMEN: Soft, Nontender, Nondistended; Bowel sounds present  NEURO: Alert & Oriented to self, opens eyes to name at times, does not follow any commands   EXTREMITIES: No LE edema, no calf tenderness  SKIN: No rashes or lesions    Consultant(s) Notes Reviewed:  [x ] YES  [ ] NO  Care Discussed with Consultants/Other Providers [ x] YES  [ ] NO    LABS:                        9.4    5.91  )-----------( 505      ( 04 Dec 2020 06:26 )             31.1     12-04    148<H>  |  116<H>  |  24<H>  ----------------------------<  85  3.4<L>   |  24  |  0.86    Ca    8.5      04 Dec 2020 06:26    TPro  6.0  /  Alb  1.7<L>  /  TBili  0.3  /  DBili  x   /  AST  19  /  ALT  15  /  AlkPhos  94  12-03      CAPILLARY BLOOD GLUCOSE      Urinalysis Basic - ( 02 Dec 2020 10:37 )    Color: Yellow / Appearance: Slightly Turbid / S.015 / pH: x  Gluc: x / Ketone: Negative  / Bili: Negative / Urobili: 1   Blood: x / Protein: 100 / Nitrite: Positive   Leuk Esterase: Moderate / RBC: 0-2 /HPF / WBC >50 /HPF   Sq Epi: x / Non Sq Epi: x / Bacteria: Many /HPF      RADIOLOGY & ADDITIONAL TESTS:    < from: CT Abdomen and Pelvis w/ IV Cont (20 @ 19:02) >    EXAM:  CT ABDOMEN AND PELVIS IC                            PROCEDURE DATE:  2020          INTERPRETATION:  CT ABDOMEN AND PELVIS WITH IV CONTRAST    CLINICAL INFORMATION: Recurrent UTIs    COMPARISON: None.    PROCEDURE:  CT of the Abdomen and Pelvis was performed with intravenous contrast.  Intravenous contrast: 90 cc Omnipaque 350  Oral contrast: None.  Sagittal and coronal reformats were performed.    FINDINGS:  LOWER CHEST: Senescent changes    LIVER: Normal.  BILE DUCTS: Nondilated.  GALLBLADDER: Normal.  SPLEEN: Normal.  PANCREAS: Diffuse atrophy.  ADRENALS: Normal.  KIDNEYS/URETERS: No calculi, hydronephrosis, or soft tissue attenuating mass. Nonenlarged, nonedematous.    BLADDER: Diffusely thickened and trabeculated urinary bladder consistent with chronic outlet obstruction.  REPRODUCTIVE ORGANS: Enlarged prostate.    BOWEL: Chronic sigmoid diverticular disease. No obstruction.  PERITONEUM: Small pelvic free fluid. No free air.  VESSELS:  Aortoiliac atherosclerosis withoutaneurysm.  RETROPERITONEUM/LYMPH NODES: No adenopathy.  ABDOMINAL WALL: Normal.  BONES: No acute bony abnormality.    IMPRESSION:    Chronic urinary bladder outlet obstruction with additional evidence of cystitis. No definite evidence of ascending infection.    < end of copied text >      Imaging Personally Reviewed:  [ ] YES  [ ] NO

## 2020-12-04 NOTE — PROGRESS NOTE ADULT - PROBLEM SELECTOR PLAN 4
-needs assistance with all ADLs   -frequent turn and positions   -aspiration precautions   -supportive care

## 2020-12-04 NOTE — PROGRESS NOTE ADULT - SUBJECTIVE AND OBJECTIVE BOX
Patient seen and examined at bedside    Vital Signs Last 24 Hrs  T(F): 97.3 (12-04-20 @ 05:15), Max: 97.8 (12-03-20 @ 22:48)  HR: 104 (12-04-20 @ 05:15)  BP: 116/53 (12-04-20 @ 05:15)  RR: 18 (12-04-20 @ 05:15)  SpO2: 100% (12-04-20 @ 05:15)    GENERAL: Alert  CHEST/LUNG: respirations nonlabored  ABDOMEN: soft, Nontender, Nondistended  : no suprapubic tenderness    I&O's Detail    03 Dec 2020 07:01  -  04 Dec 2020 07:00  --------------------------------------------------------  IN:    dextrose 5% + sodium chloride 0.9%: 600 mL    dextrose 5% + sodium chloride 0.9%: 150 mL  Total IN: 750 mL    OUT:    Voided (mL): 350 mL  Total OUT: 350 mL    Total NET: 400 mL    LABS:                        9.4    5.91  )-----------( 505      ( 04 Dec 2020 06:26 )             31.1     12-04    148<H>  |  116<H>  |  24<H>  ----------------------------<  85  3.4<L>   |  24  |  0.86    Ca    8.5      04 Dec 2020 06:26    TPro  6.0  /  Alb  1.7<L>  /  TBili  0.3  /  DBili  x   /  AST  19  /  ALT  15  /  AlkPhos  94  12-03    Culture Results:   >100,000 CFU/ml Gram Negative Rods  >100,000 CFU/ml Morganella morganii (12.02.20 @ 19:11)

## 2020-12-04 NOTE — PROGRESS NOTE ADULT - SUBJECTIVE AND OBJECTIVE BOX
Patient is seen and examined at the bed side, is afebrile.      REVIEW OF SYSTEMS: All other review systems are negative      ALLERGIES: No Known Allergies      Vital Signs Last 24 Hrs  T(C): 36.4 (04 Dec 2020 14:57), Max: 36.6 (03 Dec 2020 22:48)  T(F): 97.6 (04 Dec 2020 14:57), Max: 97.8 (03 Dec 2020 22:48)  HR: 89 (04 Dec 2020 14:57) (88 - 104)  BP: 87/59 (04 Dec 2020 14:57) (87/59 - 125/82)  BP(mean): --  RR: 18 (04 Dec 2020 14:57) (17 - 18)  SpO2: 95% (04 Dec 2020 14:57) (95% - 100%)      PHYSICAL EXAM:  GENERAL: Not in distress   CHEST/LUNG: Not using accessory muscles   HEART: s1 and s2 present  ABDOMEN:  Nontender and  Nondistended  EXTREMITIES: No pedal  edema  CNS: Awake and ?? Alert      LABS:                        9.4    5.91  )-----------( 505      ( 04 Dec 2020 06:26 )             31.1                           8.7    7.06  )-----------( 545      ( 03 Dec 2020 09:27 )             30.1       12-04    148<H>  |  116<H>  |  24<H>  ----------------------------<  85  3.4<L>   |  24  |  0.86    Ca    8.5      04 Dec 2020 06:26    TPro  6.0  /  Alb  1.7<L>  /  TBili  0.3  /  DBili  x   /  AST  19  /  ALT  15  /  AlkPhos  94  12-03    12-03    144  |  112<H>  |  29<H>  ----------------------------<  95  3.9   |  23  |  1.07    Ca    8.3<L>      03 Dec 2020 09:27    TPro  6.0  /  Alb  1.7<L>  /  TBili  0.3  /  DBili  x   /  AST  19  /  ALT  15  /  AlkPhos  94  12-03        Urinalysis Basic - ( 02 Dec 2020 10:37 )  Color: Yellow / Appearance: Slightly Turbid / S.015 / pH: x  Gluc: x / Ketone: Negative  / Bili: Negative / Urobili: 1   Blood: x / Protein: 100 / Nitrite: Positive   Leuk Esterase: Moderate / RBC: 0-2 /HPF / WBC >50 /HPF   Sq Epi: x / Non Sq Epi: x / Bacteria: Many /HPF        MEDICATIONS  (STANDING):    dextrose 5% + sodium chloride 0.9%. 1000 milliLiter(s) (75 mL/Hr) IV Continuous <Continuous>  heparin   Injectable 5000 Unit(s) SubCutaneous every 8 hours  ketorolac   Injectable 15 milliGRAM(s) IV Push once  meropenem  IVPB      meropenem  IVPB 1000 milliGRAM(s) IV Intermittent every 12 hours  tamsulosin 0.4 milliGRAM(s) Oral at bedtime        RADIOLOGY & ADDITIONAL TESTS:    20 : CT Abdomen and Pelvis w/ IV Cont (20 @ 19:02) Chronic urinary bladder outlet obstruction with additional evidence of cystitis. No definite evidence of ascending infection.      MICROBIOLOGY DATA:    Culture - Urine (20 @ 19:11)   Specimen Source: .Urine Clean Catch (Midstream)   Culture Results:   >100,000 CFU/ml Gram Negative Rods   >100,000 CFU/ml Morganella morganii     COVID-19 PCR . (20 @ 13:32)   COVID-19 PCR: NotDetec:        Patient is seen and examined at the bed side, is afebrile. The mental status has improved, answer questions. The Urine culture grew  Morganella morganii.      REVIEW OF SYSTEMS: All other review systems are negative      ALLERGIES: No Known Allergies      Vital Signs Last 24 Hrs  T(C): 36.4 (04 Dec 2020 14:57), Max: 36.6 (03 Dec 2020 22:48)  T(F): 97.6 (04 Dec 2020 14:57), Max: 97.8 (03 Dec 2020 22:48)  HR: 89 (04 Dec 2020 14:57) (88 - 104)  BP: 87/59 (04 Dec 2020 14:57) (87/59 - 125/82)  BP(mean): --  RR: 18 (04 Dec 2020 14:57) (17 - 18)  SpO2: 95% (04 Dec 2020 14:57) (95% - 100%)      PHYSICAL EXAM:  GENERAL: Not in distress   CHEST/LUNG: Not using accessory muscles   HEART: s1 and s2 present  ABDOMEN:  Nontender and  Nondistended  EXTREMITIES: No pedal  edema  CNS: Awake and ?? Alert      LABS:                        9.4    5.91  )-----------( 505      ( 04 Dec 2020 06:26 )             31.1                           8.7    7.06  )-----------( 545      ( 03 Dec 2020 09:27 )             30.1       12-04    148<H>  |  116<H>  |  24<H>  ----------------------------<  85  3.4<L>   |  24  |  0.86    Ca    8.5      04 Dec 2020 06:26    TPro  6.0  /  Alb  1.7<L>  /  TBili  0.3  /  DBili  x   /  AST  19  /  ALT  15  /  AlkPhos  94  12    12-03    144  |  112<H>  |  29<H>  ----------------------------<  95  3.9   |  23  |  1.07    Ca    8.3<L>      03 Dec 2020 09:27    TPro  6.0  /  Alb  1.7<L>  /  TBili  0.3  /  DBili  x   /  AST  19  /  ALT  15  /  AlkPhos  94  03        Urinalysis Basic - ( 02 Dec 2020 10:37 )  Color: Yellow / Appearance: Slightly Turbid / S.015 / pH: x  Gluc: x / Ketone: Negative  / Bili: Negative / Urobili: 1   Blood: x / Protein: 100 / Nitrite: Positive   Leuk Esterase: Moderate / RBC: 0-2 /HPF / WBC >50 /HPF   Sq Epi: x / Non Sq Epi: x / Bacteria: Many /HPF        MEDICATIONS  (STANDING):    dextrose 5% + sodium chloride 0.9%. 1000 milliLiter(s) (75 mL/Hr) IV Continuous <Continuous>  heparin   Injectable 5000 Unit(s) SubCutaneous every 8 hours  ketorolac   Injectable 15 milliGRAM(s) IV Push once  meropenem  IVPB      meropenem  IVPB 1000 milliGRAM(s) IV Intermittent every 12 hours  tamsulosin 0.4 milliGRAM(s) Oral at bedtime        RADIOLOGY & ADDITIONAL TESTS:    20 : CT Abdomen and Pelvis w/ IV Cont (20 @ 19:02) Chronic urinary bladder outlet obstruction with additional evidence of cystitis. No definite evidence of ascending infection.      MICROBIOLOGY DATA:    Culture - Urine (20 @ 19:11)   Specimen Source: .Urine Clean Catch (Midstream)   Culture Results:   >100,000 CFU/ml Gram Negative Rods   >100,000 CFU/ml Morganella morganii     COVID-19 PCR . (20 @ 13:32)   COVID-19 PCR: NotDetec:

## 2020-12-04 NOTE — PROGRESS NOTE ADULT - PROBLEM SELECTOR PLAN 7
-discharge disposition home with 24 HHA and home hospice pending urine culture sensitivities  -CM/SW following

## 2020-12-04 NOTE — PROGRESS NOTE ADULT - PROBLEM SELECTOR PLAN 5
-DNR/DNI  -MOLST in chart -DNR/DNI, comfort measures only   -MOLST in chart  -Palliative team following

## 2020-12-04 NOTE — PROGRESS NOTE ADULT - ASSESSMENT
89 y/o bedbound male (HHA 24/7) with a PMHx of Dementia presents to ED c/o dysuria and frequency and reported greenish penile discharge from [facility].    1. Complicated UTI   C/W Meropenem. F/U urine culture for sensitivity of Morganella. Adjust antibiotics as needed. C/W Tamsulosin. Monitor I&O. Possible Marroquin catheter for retention. Monitor labs.    2. Dementia   Not on any medications at home. Monitor mental status.     3. Severe Protein- Calorie Malnutrition  Nutrition consult pending. Continue to encourage puree diet.     DISPO: DVT Prophylaxis with 5000U Heparin SubQ    GOC: DNR/DNI, comfort measures only

## 2020-12-04 NOTE — PROGRESS NOTE ADULT - SUBJECTIVE AND OBJECTIVE BOX
Patient is a 89y old  Male who presents with a chief complaint of Complicated UTI (03 Dec 2020 21:21)    pt seen in icu [  ], reg med/surg floor [ x  ], bed [ x ], chair at bedside [   ], awake and responsive [ x ], lethargic [  ],    nad [ x ]      Allergies    No Known Allergies        Vitals    T(F): 97.3 (12-04-20 @ 05:15), Max: 97.8 (12-03-20 @ 22:48)  HR: 104 (12-04-20 @ 05:15) (87 - 104)  BP: 116/53 (12-04-20 @ 05:15) (116/53 - 125/82)  RR: 18 (12-04-20 @ 05:15) (17 - 18)  SpO2: 100% (12-04-20 @ 05:15) (96% - 100%)  Wt(kg): --  CAPILLARY BLOOD GLUCOSE          Labs                          8.7    7.06  )-----------( 545      ( 03 Dec 2020 09:27 )             30.1       12-03    144  |  112<H>  |  29<H>  ----------------------------<  95  3.9   |  23  |  1.07    Ca    8.3<L>      03 Dec 2020 09:27    TPro  6.0  /  Alb  1.7<L>  /  TBili  0.3  /  DBili  x   /  AST  19  /  ALT  15  /  AlkPhos  94  12-03            .Urine Clean Catch (Midstream)  12-02 @ 19:11   >100,000 CFU/ml Gram Negative Rods  >100,000 CFU/ml Morganella morganii  --  --          Radiology Results      Meds    MEDICATIONS  (STANDING):  dextrose 5% + sodium chloride 0.9%. 1000 milliLiter(s) (75 mL/Hr) IV Continuous <Continuous>  haloperidol    Injectable 2 milliGRAM(s) IV Push once  heparin   Injectable 5000 Unit(s) SubCutaneous every 8 hours  ketorolac   Injectable 15 milliGRAM(s) IV Push once  meropenem  IVPB      meropenem  IVPB 1000 milliGRAM(s) IV Intermittent every 12 hours  tamsulosin 0.4 milliGRAM(s) Oral at bedtime      MEDICATIONS  (PRN):      Physical Exam    Neuro :  no focal deficits  Respiratory: CTA B/L  CV: RRR, S1S2, no murmurs,   Abdominal: Soft, NT, ND +BS,  Extremities: No edema, + peripheral pulses    ASSESSMENT    Urinary tract infection   sepsis   cystitis  chronic bladder outlet obstruction  h/o Blind right eye  Dementia            PLAN    cont merem to cover esbl   id cons  f/u blood and ucx  urology cons noted  pt incontinent/voiding  cont to monitor for UO   cont flomax  as of [facility] conversation 8/30/2018 :pt wants his daughter Swapna to speak for him, as surrogate.   palliative eval  cont current meds           Patient is a 89y old  Male who presents with a chief complaint of Complicated UTI (03 Dec 2020 21:21)    pt seen in icu [  ], reg med/surg floor [ x  ], bed [ x ], chair at bedside [   ], awake and responsive [ x ], lethargic [  ],    nad [ x ]      Allergies    No Known Allergies        Vitals    T(F): 97.3 (12-04-20 @ 05:15), Max: 97.8 (12-03-20 @ 22:48)  HR: 104 (12-04-20 @ 05:15) (87 - 104)  BP: 116/53 (12-04-20 @ 05:15) (116/53 - 125/82)  RR: 18 (12-04-20 @ 05:15) (17 - 18)  SpO2: 100% (12-04-20 @ 05:15) (96% - 100%)  Wt(kg): --  CAPILLARY BLOOD GLUCOSE          Labs                          8.7    7.06  )-----------( 545      ( 03 Dec 2020 09:27 )             30.1       12-03    144  |  112<H>  |  29<H>  ----------------------------<  95  3.9   |  23  |  1.07    Ca    8.3<L>      03 Dec 2020 09:27    TPro  6.0  /  Alb  1.7<L>  /  TBili  0.3  /  DBili  x   /  AST  19  /  ALT  15  /  AlkPhos  94  12-03            .Urine Clean Catch (Midstream)  12-02 @ 19:11   >100,000 CFU/ml Gram Negative Rods  >100,000 CFU/ml Morganella morganii  --  --          Radiology Results      Meds    MEDICATIONS  (STANDING):  dextrose 5% + sodium chloride 0.9%. 1000 milliLiter(s) (75 mL/Hr) IV Continuous <Continuous>  haloperidol    Injectable 2 milliGRAM(s) IV Push once  heparin   Injectable 5000 Unit(s) SubCutaneous every 8 hours  ketorolac   Injectable 15 milliGRAM(s) IV Push once  meropenem  IVPB      meropenem  IVPB 1000 milliGRAM(s) IV Intermittent every 12 hours  tamsulosin 0.4 milliGRAM(s) Oral at bedtime      MEDICATIONS  (PRN):      Physical Exam    Neuro :  no focal deficits  Respiratory: CTA B/L  CV: RRR, S1S2, no murmurs,   Abdominal: Soft, NT, ND +BS,  Extremities: No edema, + peripheral pulses    ASSESSMENT    Urinary tract infection   sepsis   cystitis  chronic bladder outlet obstruction  h/o Blind right eye  Dementia            PLAN    ucx with gnr and morganella noted above   f/u sens  cont merem   id f/u  f/u blood cx   urology f/u   pt incontinent/voiding  cont to monitor for UO   hobbs cath if retaining  cont flomax  as of [facility] conversation 8/30/2018 :pt wants his daughter Swapna to speak for him, as surrogate.   palliative eval noted  DNR Order; Comfort measures only; Do not re-hospitalize; No artificial nutrition  DNI  Do not send to hospital unless pain or severe symptoms cannot be otherwise controlled  cont current meds  d/c plan for home hospice pending ucx sens

## 2020-12-04 NOTE — PROGRESS NOTE ADULT - ASSESSMENT
89 year old male with complicated UTI secondary to Morganella morganii and GNR, Now DNR/DNI, comfort measures only    - continue antibiotics, adjust per urine culture sensitivities when resulted  - follow up urine cultures and sensitivities  - continue medical management  - comfort care  - no  intervention at this time  - will discuss with Dr. Cueva 89 year old male with complicated UTI secondary to Morganella morganii and GNR, Now DNR/DNI, comfort measures only    - continue antibiotics, adjust per urine culture sensitivities when resulted  - follow up urine cultures and sensitivities  - continue medical management  - check post void residual (if incontinent random residual after wetting bed), if elevated (>300) recommend hobbs catheter,   - comfort care  - no  intervention at this time  - Urology will sign off, please call with further questions.

## 2020-12-04 NOTE — PROGRESS NOTE ADULT - ASSESSMENT
89 y/o M pt from home bedbound have [facility] 24/7 with a PMHx of Dementia, axo x0 at baseline sent to ED for reported greenish penile discharge noticed  by [facility]. Pt was found to have elevated Lactate and positive UA, admitted to medicine for complicated UTI, started on Meropenem. ID Dr. Duffy following

## 2020-12-04 NOTE — PROGRESS NOTE ADULT - ASSESSMENT
Patient is a 89y old  Male from home bedbound have [facility] 24/7 with a PMHx of Dementia brought in to  the ER for evaluation of greenish penile discharge by [facility]. Patient had a UTI in October and completed a course of Cephalexin 500mg BID but sx persisted in November and he went to urgent care where he was given Cefuroxime 500mg BID.  Patient was c/o  dysuria, frequency  and when home aid went to change diaper noticed purulent greenish discharge in diaper. pt recently could not take solid food , lost 25 pounds in last 2 month. He had a decline in cognitive function recently. ON admission, he found to have no fever or Leukocytosis, but positive Urine analysis. He has started on Meropenem and the ID consult requested to assist with further evaluation and antibiotic management.     # UTI  # Metabolic encephalopathy     would recommend:    1. Follow up final Urine culture for sensitivity of Morganella  2. Continue Meropenem until work up is done  3. Monitor kidney function  4. Aspiration precaution  5. Please monitor kidney function and IVF      Attending Attestation:    Spent more than 45 minutes on total encounter, more than 50 % of the visit was spent counseling and/or coordinating care by the Attending physician.   Patient is a 89y old  Male from home bedbound have [facility] 24/7 with a PMHx of Dementia brought in to  the ER for evaluation of greenish penile discharge by [facility]. Patient had a UTI in October and completed a course of Cephalexin 500mg BID but sx persisted in November and he went to urgent care where he was given Cefuroxime 500mg BID.  Patient was c/o  dysuria, frequency  and when home aid went to change diaper noticed purulent greenish discharge in diaper. pt recently could not take solid food , lost 25 pounds in last 2 month. He had a decline in cognitive function recently. ON admission, he found to have no fever or Leukocytosis, but positive Urine analysis. He has started on Meropenem and the ID consult requested to assist with further evaluation and antibiotic management.     # UTI - Urine Cx grew  Morganella morganii   # Metabolic encephalopathy - resolving     would recommend:    1. Follow up final Urine culture for sensitivity of Morganella  2. Continue Meropenem until work up is done  3. Monitor kidney function  4. Aspiration precaution  5. Please monitor kidney function and IVF      Attending Attestation:    Spent more than 45 minutes on total encounter, more than 50 % of the visit was spent counseling and/or coordinating care by the Attending physician.

## 2020-12-05 NOTE — DIETITIAN INITIAL EVALUATION ADULT. - FACTORS AFF FOOD INTAKE
weakness,/difficulty with food procurement/preparation/other (specify)/persistent lack of appetite weakness, respiratory failure, dementia, chronic bladder outlet obstruction, h/o rt. blind eye/difficulty with food procurement/preparation/other (specify)/persistent lack of appetite

## 2020-12-05 NOTE — PROGRESS NOTE ADULT - SUBJECTIVE AND OBJECTIVE BOX
Patient is a 89y old  Male who presents with a chief complaint of Complicated UTI (04 Dec 2020 16:45)    pt seen in icu [  ], reg med/surg floor [ x  ], bed [ x ], chair at bedside [   ], awake and responsive [ x ], lethargic [  ],    nad [ x ]      Allergies    No Known Allergies        Vitals    T(F): 97.3 (12-05-20 @ 05:35), Max: 97.6 (12-04-20 @ 14:57)  HR: 51 (12-05-20 @ 05:35) (51 - 89)  BP: 94/53 (12-05-20 @ 05:35) (87/59 - 110/70)  RR: 18 (12-05-20 @ 05:35) (17 - 18)  SpO2: 78% (12-05-20 @ 05:35) (78% - 96%)  Wt(kg): --  CAPILLARY BLOOD GLUCOSE          Labs                          9.4    5.91  )-----------( 505      ( 04 Dec 2020 06:26 )             31.1       12-04    148<H>  |  116<H>  |  24<H>  ----------------------------<  85  3.4<L>   |  24  |  0.86    Ca    8.5      04 Dec 2020 06:26    TPro  6.0  /  Alb  1.7<L>  /  TBili  0.3  /  DBili  x   /  AST  19  /  ALT  15  /  AlkPhos  94  12-03            .Urine Clean Catch (Midstream)  12-02 @ 19:11   >100,000 CFU/ml Morganella morganii  >100,000 CFU/ml Escherichia coli ESBL  --  Escherichia coli ESBL  Morganella morganii          Radiology Results      Meds    MEDICATIONS  (STANDING):  dextrose 5% + sodium chloride 0.9%. 1000 milliLiter(s) (75 mL/Hr) IV Continuous <Continuous>  heparin   Injectable 5000 Unit(s) SubCutaneous every 8 hours  ketorolac   Injectable 15 milliGRAM(s) IV Push once  meropenem  IVPB      meropenem  IVPB 1000 milliGRAM(s) IV Intermittent every 12 hours  tamsulosin 0.4 milliGRAM(s) Oral at bedtime      MEDICATIONS  (PRN):      Physical Exam    Neuro :  no focal deficits  Respiratory: CTA B/L  CV: RRR, S1S2, no murmurs,   Abdominal: Soft, NT, ND +BS,  Extremities: No edema, + peripheral pulses    ASSESSMENT    Urinary tract infection   sepsis   cystitis  chronic bladder outlet obstruction  h/o Blind right eye  Dementia          PLAN    ucx with gnr and morganella noted above   f/u sens  cont merem   id f/u  f/u blood cx   urology f/u   pt incontinent/voiding  cont to monitor for UO   hobbs cath if retaining  cont flomax  as of [facility] conversation 8/30/2018 :pt wants his daughter Swapna to speak for him, as surrogate.   palliative eval noted  DNR Order; Comfort measures only; Do not re-hospitalize; No artificial nutrition  DNI  Do not send to hospital unless pain or severe symptoms cannot be otherwise controlled  cont current meds  d/c plan for home hospice pending ucx sens             Patient is a 89y old  Male who presents with a chief complaint of Complicated UTI (04 Dec 2020 16:45)    pt seen in icu [  ], reg med/surg floor [ x  ], bed [ x ], chair at bedside [   ], awake and responsive [ x ], lethargic [  ],    nad [ x ]      Allergies    No Known Allergies        Vitals    T(F): 97.3 (12-05-20 @ 05:35), Max: 97.6 (12-04-20 @ 14:57)  HR: 51 (12-05-20 @ 05:35) (51 - 89)  BP: 94/53 (12-05-20 @ 05:35) (87/59 - 110/70)  RR: 18 (12-05-20 @ 05:35) (17 - 18)  SpO2: 78% (12-05-20 @ 05:35) (78% - 96%)  Wt(kg): --  CAPILLARY BLOOD GLUCOSE          Labs                          9.4    5.91  )-----------( 505      ( 04 Dec 2020 06:26 )             31.1       12-04    148<H>  |  116<H>  |  24<H>  ----------------------------<  85  3.4<L>   |  24  |  0.86    Ca    8.5      04 Dec 2020 06:26    TPro  6.0  /  Alb  1.7<L>  /  TBili  0.3  /  DBili  x   /  AST  19  /  ALT  15  /  AlkPhos  94  12-03            .Urine Clean Catch (Midstream)  12-02 @ 19:11   >100,000 CFU/ml Morganella morganii  >100,000 CFU/ml Escherichia coli ESBL  --  Escherichia coli ESBL  Morganella morganii  Culture - Urine (12.02.20 @ 19:11)   - Amikacin: S <=16   - Amikacin: S <=16   - Amoxicillin/Clavulanic Acid: R >16/8   - Amoxicillin/Clavulanic Acid: I 16/8   - Ampicillin: R >16 These ampicillin results predict results for amoxicillin   - Ampicillin: R >16 These ampicillin results predict results for amoxicillin   - Ampicillin/Sulbactam: S <=4/2 Enterobacter, Citrobacter, and Serratia may develop resistance during prolonged therapy (3-4 days)   - Ampicillin/Sulbactam: R >16/8 Enterobacter, Citrobacter, and Serratia may develop resistance during prolonged therapy (3-4 days)   - Aztreonam: R >16   - Aztreonam: S <=4   - Cefazolin: R >16   - Cefazolin: R >16 ([id number]) For uncomplicated UTI with K. pneumoniae, E. coli, or P. mirablis: RACQUEL <=16 is sensitive and RACQUEL >=32 is resistant. This also predicts results for oral agents cefaclor, cefdinir, cefpodoxime, cefprozil, cefuroxime axetil, cephalexin and locarbef for uncomplicated UTI. Note that some isolates may be susceptible to these agents while testing resistant to cefazolin.   - Cefepime: R >16   - Cefepime: S <=2   - Cefoxitin: S <=8   - Cefoxitin: I 16   - Ceftriaxone: R >32 Enterobacter, Citrobacter, and Serratia may develop resistance during prolonged therapy   - Ceftriaxone: S <=1 Enterobacter, Citrobacter, and Serratia may develop resistance during prolonged therapy   - Gentamicin: I 8   - Gentamicin: S <=2   - Imipenem: S <=1   - Imipenem: I 2   - Levofloxacin: I 1   - Levofloxacin: R >4   - Ciprofloxacin: R 2   - Ciprofloxacin: R >2   - Ertapenem: S <=0.5   - Ertapenem: S <=0.5   - Meropenem: S <=1   - Meropenem: S <=1   - Nitrofurantoin: R >64 Should not be used to treat pyelonephritis   - Nitrofurantoin: S <=32 Should not be used to treat pyelonephritis   - Piperacillin/Tazobactam: S <=8   - Piperacillin/Tazobactam: S <=8   - Tigecycline: R <=2   - Tigecycline: S <=2   - Tobramycin: S <=2   - Tobramycin: S 4   - Trimethoprim/Sulfamethoxazole: S <=0.5/9.5   - Trimethoprim/Sulfamethoxazole: R >2/38         Radiology Results      Meds    MEDICATIONS  (STANDING):  dextrose 5% + sodium chloride 0.9%. 1000 milliLiter(s) (75 mL/Hr) IV Continuous <Continuous>  heparin   Injectable 5000 Unit(s) SubCutaneous every 8 hours  ketorolac   Injectable 15 milliGRAM(s) IV Push once  meropenem  IVPB      meropenem  IVPB 1000 milliGRAM(s) IV Intermittent every 12 hours  tamsulosin 0.4 milliGRAM(s) Oral at bedtime      MEDICATIONS  (PRN):      Physical Exam    Neuro :  no focal deficits  Respiratory: CTA B/L  CV: RRR, S1S2, no murmurs,   Abdominal: Soft, NT, ND +BS,  Extremities: No edema, + peripheral pulses    ASSESSMENT    Urinary tract infection   sepsis   cystitis  chronic bladder outlet obstruction  h/o Blind right eye  Dementia          PLAN    ucx and sens with esbl e coli and morganella noted above   cont merem   id f/u  urology f/u   pt incontinent/voiding  cont to monitor for UO   hobbs cath if retaining  cont flomax  as of [facility] conversation 8/30/2018 :pt wants his daughter Swapna to speak for him, as surrogate.   palliative eval noted  DNR Order; Comfort measures only; Do not re-hospitalize; No artificial nutrition  DNI  Do not send to hospital unless pain or severe symptoms cannot be otherwise controlled  cont current meds  d/c plan for home hospice pending dme

## 2020-12-05 NOTE — DIETITIAN INITIAL EVALUATION ADULT. - PROBLEM SELECTOR PLAN 1
h/o recurrent UTI, green , pussy penile discharge  Cephalexin 500mg BIDX1 course in October &  Cefuroxime 500mg BID X1 course in November without resolution of sx.  U/A significant for: elevated WBC, Bacteruria   s/p Rocephin 1gr in ED   Management:  - will start on meropenem given history of recurrent UTI  - Check U/C   - f/u CT pelvic and abdomen with contrast  - Urology was informed , rec to treat the infection for now as patient is elderly and currently voiding and touch base with urology tomorrow in case he is retaining.   - Dr Duffy Consulted

## 2020-12-05 NOTE — DIETITIAN INITIAL EVALUATION ADULT. - OTHER INFO
Patient from home has [facility] (247/7). Visited pt. alert very weak & cachectic, called daughter, [facility], per chart/reports of poor po "no solid foods" intake with 25 Lbs weight loss >2months, d//w PCA, pt. refusing meals, intake <25% noted, per family wants no feeding tubes, aggressive treatments, wants comfort measures & agreed to home hospice, followed by Palliative Care team & Worker, bruised skin, no edema.

## 2020-12-05 NOTE — PROGRESS NOTE ADULT - ASSESSMENT
Patient is a 89y old  Male from home bedbound have [facility] 24/7 with a PMHx of Dementia brought in to  the ER for evaluation of greenish penile discharge by [facility]. Patient had a UTI in October and completed a course of Cephalexin 500mg BID but sx persisted in November and he went to urgent care where he was given Cefuroxime 500mg BID.  Patient was c/o  dysuria, frequency  and when home aid went to change diaper noticed purulent greenish discharge in diaper. pt recently could not take solid food , lost 25 pounds in last 2 month. He had a decline in cognitive function recently. ON admission, he found to have no fever or Leukocytosis, but positive Urine analysis. He has started on Meropenem and the ID consult requested to assist with further evaluation and antibiotic management.     # UTI - Urine Cx grew  Morganella morganii   # Metabolic encephalopathy - resolving     would recommend:    1. IVF and monitor kidney function   2. Continue Meropenem and adjust doses according to Crcl  3. Aspiration precaution  4. Aspiration precaution    d/w [place] and Nursing staff    Attending Attestation:    Spent more than 45 minutes on total encounter, more than 50 % of the visit was spent counseling and/or coordinating care by the Attending physician.

## 2020-12-05 NOTE — DIETITIAN INITIAL EVALUATION ADULT. - PERTINENT LABORATORY DATA
12-05 Na152 mmol/L<H> Glu 99 mg/dL K+ 3.9 mmol/L Cr  1.39 mg/dL<H> BUN 31 mg/dL<H> 12-05 Alb 1.7 g/dL<L>

## 2020-12-05 NOTE — CHART NOTE - TREATMENT: THE FOLLOWING DIET HAS BEEN RECOMMENDED
Diet, Pureed:   Supplement Feeding Modality:  Oral  Ensure Enlive Cans or Servings Per Day:  1       Frequency:  Three Times a day (12-05-20 @ 10:53) [Pending Verification By Attending]  Diet, Pureed (12-02-20 @ 15:47) [Active]

## 2020-12-05 NOTE — CHART NOTE - FINDINGS AS BASED ON:
Intervention secondary to interdisciplinary rounds/Comprehensive nutrition assessment and consultation/Food acceptance and intake status from observations by staff

## 2020-12-05 NOTE — PROGRESS NOTE ADULT - SUBJECTIVE AND OBJECTIVE BOX
Patient is seen and examined at the bed side, is afebrile. The Urine culture grew  Morganella morganii and ESBL E.coli.      REVIEW OF SYSTEMS: All other review systems are negative      ALLERGIES: No Known Allergies      Vital Signs Last 24 Hrs  T(C): 36.3 (05 Dec 2020 05:35), Max: 36.4 (04 Dec 2020 14:57)  T(F): 97.3 (05 Dec 2020 05:35), Max: 97.6 (04 Dec 2020 14:57)  HR: 51 (05 Dec 2020 05:35) (51 - 89)  BP: 94/53 (05 Dec 2020 05:35) (87/59 - 110/70)  BP(mean): --  RR: 18 (05 Dec 2020 05:35) (17 - 18)  SpO2: 78% (05 Dec 2020 05:35) (78% - 96%)      PHYSICAL EXAM:  GENERAL: Not in distress   CHEST/LUNG: Not using accessory muscles   HEART: s1 and s2 present  ABDOMEN:  Nontender and  Nondistended  EXTREMITIES: No pedal  edema  CNS: Awake and confused      LABS:                        8.5    7.68  )-----------( 420      ( 05 Dec 2020 07:52 )             29.3                           9.4    5.91  )-----------( 505      ( 04 Dec 2020 06:26 )             31.1             152<H>  |  122<H>  |  31<H>  ----------------------------<  99  3.9   |  17<L>  |  1.39<H>    Ca    8.2<L>      05 Dec 2020 07:52    TPro  5.6<L>  /  Alb  1.7<L>  /  TBili  0.4  /  DBili  x   /  AST  41<H>  /  ALT  23  /  AlkPhos  88      12-    148<H>  |  116<H>  |  24<H>  ----------------------------<  85  3.4<L>   |  24  |  0.86    Ca    8.5      04 Dec 2020 06:26    TPro  6.0  /  Alb  1.7<L>  /  TBili  0.3  /  DBili  x   /  AST  19  /  ALT  15  /  AlkPhos  94  12-03        Urinalysis Basic - ( 02 Dec 2020 10:37 )  Color: Yellow / Appearance: Slightly Turbid / S.015 / pH: x  Gluc: x / Ketone: Negative  / Bili: Negative / Urobili: 1   Blood: x / Protein: 100 / Nitrite: Positive   Leuk Esterase: Moderate / RBC: 0-2 /HPF / WBC >50 /HPF   Sq Epi: x / Non Sq Epi: x / Bacteria: Many /HPF        MEDICATIONS  (STANDING):    dextrose 5% + sodium chloride 0.9%. 1000 milliLiter(s) (75 mL/Hr) IV Continuous <Continuous>  heparin   Injectable 5000 Unit(s) SubCutaneous every 8 hours  ketorolac   Injectable 15 milliGRAM(s) IV Push once  meropenem  IVPB      meropenem  IVPB 1000 milliGRAM(s) IV Intermittent every 12 hours  tamsulosin 0.4 milliGRAM(s) Oral at bedtime          RADIOLOGY & ADDITIONAL TESTS:    20 : CT Abdomen and Pelvis w/ IV Cont (20 @ 19:02) Chronic urinary bladder outlet obstruction with additional evidence of cystitis. No definite evidence of ascending infection.      MICROBIOLOGY DATA:    Culture - Urine (20 @ 19:11)   - Ciprofloxacin: R 2   - Ciprofloxacin: R >2   - Ertapenem: S <=0.5   - Ertapenem: S <=0.5   - Meropenem: S <=1   - Meropenem: S <=1   - Nitrofurantoin: R >64 Should not be used to treat pyelonephritis   - Nitrofurantoin: S <=32 Should not be used to treat pyelonephritis   - Piperacillin/Tazobactam: S <=8   - Piperacillin/Tazobactam: S <=8   - Tigecycline: R <=2   - Tigecycline: S <=2   - Tobramycin: S <=2   - Tobramycin: S 4   - Trimethoprim/Sulfamethoxazole: S <=0.5/9.5   - Trimethoprim/Sulfamethoxazole: R >2/38   - Amikacin: S <=16   - Amikacin: S <=16   - Amoxicillin/Clavulanic Acid: R >16/8   - Amoxicillin/Clavulanic Acid: I 16/8   - Ampicillin: R >16 These ampicillin results predict results for amoxicillin   - Ampicillin: R >16 These ampicillin results predict results for amoxicillin   - Ampicillin/Sulbactam: S <=4/2 Enterobacter, Citrobacter, and Serratia may develop resistance during prolonged therapy (3-4 days)   - Ampicillin/Sulbactam: R >16/8 Enterobacter, Citrobacter, and Serratia may develop resistance during prolonged therapy (3-4 days)   - Aztreonam: R >16   - Aztreonam: S <=4   - Cefazolin: R >16   - Cefazolin: R >16 ([email or web address]) For uncomplicated UTI with K. pneumoniae, E. coli, or P. mirablis: RACQUEL <=16 is sensitive and RACQUEL >=32 is resistant. This also predicts results for oral agents cefaclor, cefdinir, cefpodoxime, cefprozil, cefuroxime axetil, cephalexin and locarbef for uncomplicated UTI. Note that some isolates may be susceptible to these agents while testing resistant to cefazolin.   - Cefepime: R >16   - Cefepime: S <=2   - Cefoxitin: S <=8   - Cefoxitin: I 16   - Ceftriaxone: R >32 Enterobacter, Citrobacter, and Serratia may develop resistance during prolonged therapy   - Ceftriaxone: S <=1 Enterobacter, Citrobacter, and Serratia may develop resistance during prolonged therapy   - Gentamicin: I 8   - Gentamicin: S <=2   - Imipenem: S <=1   - Imipenem: I 2   - Levofloxacin: I 1   - Levofloxacin: R >4   Specimen Source: .Urine Clean Catch (Midstream)   Culture Results:   >100,000 CFU/ml Morganella morganii   >100,000 CFU/ml Escherichia coli ESBL   Organism Identification: Escherichia coli ESBL   Morganella morganii   Organism: Escherichia coli ESBL   Organism: Morganella morganii   Method Type: RACQUEL   Culture - Urine (20 @ 19:11)   Specimen Source: .Urine Clean Catch (Midstream)   Culture Results:   >100,000 CFU/ml Gram Negative Rods   >100,000 CFU/ml Morganella morganii     COVID-19 PCR . (20 @ 13:32)   COVID-19 PCR: NotDetec:

## 2020-12-06 NOTE — PROGRESS NOTE ADULT - SUBJECTIVE AND OBJECTIVE BOX
Patient is a 89y old  Male who presents with a chief complaint of Complicated UTI (05 Dec 2020 11:48)    pt seen in icu [  ], reg med/surg floor [ x  ], bed [ x ], chair at bedside [   ], awake and responsive [ x ], lethargic [  ],    nad [ x ]        Allergies    No Known Allergies        Vitals    T(F): 97.3 (12-05-20 @ 20:44), Max: 97.6 (12-05-20 @ 13:33)  HR: 76 (12-05-20 @ 20:44) (76 - 77)  BP: 98/54 (12-05-20 @ 20:44) (98/54 - 111/69)  RR: 18 (12-05-20 @ 20:44) (18 - 18)  SpO2: 97% (12-05-20 @ 20:44) (96% - 97%)  Wt(kg): --  CAPILLARY BLOOD GLUCOSE          Labs                          8.5    7.68  )-----------( 420      ( 05 Dec 2020 07:52 )             29.3       12-05    152<H>  |  122<H>  |  31<H>  ----------------------------<  99  3.9   |  17<L>  |  1.39<H>    Ca    8.2<L>      05 Dec 2020 07:52    TPro  5.6<L>  /  Alb  1.7<L>  /  TBili  0.4  /  DBili  x   /  AST  41<H>  /  ALT  23  /  AlkPhos  88  12-05            .Urine Clean Catch (Midstream)  12-02 @ 19:11   >100,000 CFU/ml Morganella morganii  >100,000 CFU/ml Escherichia coli ESBL  --  Escherichia coli ESBL  Morganella morganii          Radiology Results      Meds    MEDICATIONS  (STANDING):  dextrose 5% + sodium chloride 0.9%. 1000 milliLiter(s) (75 mL/Hr) IV Continuous <Continuous>  heparin   Injectable 5000 Unit(s) SubCutaneous every 8 hours  ketorolac   Injectable 15 milliGRAM(s) IV Push once  meropenem  IVPB      meropenem  IVPB 1000 milliGRAM(s) IV Intermittent every 12 hours  tamsulosin 0.4 milliGRAM(s) Oral at bedtime      MEDICATIONS  (PRN):      Physical Exam    Neuro :  no focal deficits  Respiratory: CTA B/L  CV: RRR, S1S2, no murmurs,   Abdominal: Soft, NT, ND +BS,  Extremities: No edema, + peripheral pulses    ASSESSMENT    Urinary tract infection   sepsis   cystitis  chronic bladder outlet obstruction  h/o Blind right eye  Dementia          PLAN    ucx and sens with esbl e coli and morganella noted above   cont merem   id f/u  urology f/u   pt incontinent/voiding  cont to monitor for UO   hobbs cath if retaining  cont flomax  as of c conversation 8/30/2018 :pt wants his daughter Swapna to speak for him, as surrogate.   palliative eval noted  DNR Order; Comfort measures only; Do not re-hospitalize; No artificial nutrition  DNI  Do not send to hospital unless pain or severe symptoms cannot be otherwise controlled  cont current meds  d/c plan for home hospice pending dme       Patient is a 89y old  Male who presents with a chief complaint of Complicated UTI (05 Dec 2020 11:48)    pt seen in icu [  ], reg med/surg floor [ x  ], bed [ x ], chair at bedside [   ], awake and responsive [ x ], lethargic [  ],    nad [ x ]        Allergies    No Known Allergies        Vitals    T(F): 97.3 (12-05-20 @ 20:44), Max: 97.6 (12-05-20 @ 13:33)  HR: 76 (12-05-20 @ 20:44) (76 - 77)  BP: 98/54 (12-05-20 @ 20:44) (98/54 - 111/69)  RR: 18 (12-05-20 @ 20:44) (18 - 18)  SpO2: 97% (12-05-20 @ 20:44) (96% - 97%)  Wt(kg): --  CAPILLARY BLOOD GLUCOSE          Labs                          8.5    7.68  )-----------( 420      ( 05 Dec 2020 07:52 )             29.3       12-05    152<H>  |  122<H>  |  31<H>  ----------------------------<  99  3.9   |  17<L>  |  1.39<H>    Ca    8.2<L>      05 Dec 2020 07:52    TPro  5.6<L>  /  Alb  1.7<L>  /  TBili  0.4  /  DBili  x   /  AST  41<H>  /  ALT  23  /  AlkPhos  88  12-05            .Urine Clean Catch (Midstream)  12-02 @ 19:11   >100,000 CFU/ml Morganella morganii  >100,000 CFU/ml Escherichia coli ESBL  --  Escherichia coli ESBL  Morganella morganii          Radiology Results      Meds    MEDICATIONS  (STANDING):  dextrose 5% + sodium chloride 0.9%. 1000 milliLiter(s) (75 mL/Hr) IV Continuous <Continuous>  heparin   Injectable 5000 Unit(s) SubCutaneous every 8 hours  ketorolac   Injectable 15 milliGRAM(s) IV Push once  meropenem  IVPB      meropenem  IVPB 1000 milliGRAM(s) IV Intermittent every 12 hours  tamsulosin 0.4 milliGRAM(s) Oral at bedtime      MEDICATIONS  (PRN):      Physical Exam    Neuro :  no focal deficits  Respiratory: CTA B/L  CV: RRR, S1S2, no murmurs,   Abdominal: Soft, NT, ND +BS,  Extremities: No edema, + peripheral pulses    ASSESSMENT    Urinary tract infection   sepsis   cystitis  chronic bladder outlet obstruction  h/o Blind right eye  Dementia          PLAN    ucx and sens with esbl e coli and morganella noted above   cont merem   id f/u  urology f/u   pt incontinent/voiding  cont to monitor for UO   hobbs cath if retaining  cont flomax  as of c conversation 8/30/2018 :pt wants his daughter Swapna to speak for him, as surrogate.   palliative eval noted  DNR Order; Comfort measures only; Do not re-hospitalize; No artificial nutrition  DNI  Do not send to hospital unless pain or severe symptoms cannot be otherwise controlled  cont current meds  d/c plan for home hospice pending acceptance and dme

## 2020-12-06 NOTE — PROGRESS NOTE ADULT - ASSESSMENT
Patient is a 89y old  Male from home bedbound have [facility] 24/7 with a PMHx of Dementia brought in to  the ER for evaluation of greenish penile discharge by [facility]. Patient had a UTI in October and completed a course of Cephalexin 500mg BID but sx persisted in November and he went to urgent care where he was given Cefuroxime 500mg BID.  Patient was c/o  dysuria, frequency  and when home aid went to change diaper noticed purulent greenish discharge in diaper. pt recently could not take solid food , lost 25 pounds in last 2 month. He had a decline in cognitive function recently. ON admission, he found to have no fever or Leukocytosis, but positive Urine analysis. He has started on Meropenem and the ID consult requested to assist with further evaluation and antibiotic management.     # UTI - Urine Cx grew  Morganella morganii and ESBL E.coli  # Metabolic encephalopathy - resolving     would recommend:    1. IVF and monitor kidney function   2. Continue Meropenem and adjust doses according to Crcl  3. Aspiration precaution  4. Aspiration precaution    d/w Yimi and Nursing staff    Attending Attestation:    Spent more than 45 minutes on total encounter, more than 50 % of the visit was spent counseling and/or coordinating care by the Attending physician.   Patient is a 89y old  Male from home bedbound have [facility] 24/7 with a PMHx of Dementia brought in to  the ER for evaluation of greenish penile discharge by [facility]. Patient had a UTI in October and completed a course of Cephalexin 500mg BID but sx persisted in November and he went to urgent care where he was given Cefuroxime 500mg BID.  Patient was c/o  dysuria, frequency  and when home aid went to change diaper noticed purulent greenish discharge in diaper. pt recently could not take solid food , lost 25 pounds in last 2 month. He had a decline in cognitive function recently. ON admission, he found to have no fever or Leukocytosis, but positive Urine analysis. He has started on Meropenem and the ID consult requested to assist with further evaluation and antibiotic management.     # UTI - Urine Cx grew  Morganella morganii and ESBL E.coli  # Metabolic encephalopathy - resolving     would recommend:    1. IVF and monitor kidney function   2. Continue Meropenem until 12/9/20 X 3 more days   3. Aspiration precaution  4. Aspiration precaution    d/w Nursing staff    Attending Attestation:    Spent more than 35 minutes on total encounter, more than 50 % of the visit was spent counseling and/or coordinating care by the Attending physician.

## 2020-12-06 NOTE — PROGRESS NOTE ADULT - SUBJECTIVE AND OBJECTIVE BOX
Patient is seen and examined at the bed side, is afebrile. The Urine culture grew  Morganella morganii and ESBL E.coli.      REVIEW OF SYSTEMS: All other review systems are negative      ALLERGIES: No Known Allergies      Vital Signs Last 24 Hrs  T(C): 36.1 (06 Dec 2020 06:00), Max: 36.3 (05 Dec 2020 20:44)  T(F): 97 (06 Dec 2020 06:00), Max: 97.3 (05 Dec 2020 20:44)  HR: 82 (06 Dec 2020 06:00) (76 - 82)  BP: 104/53 (06 Dec 2020 06:00) (98/54 - 104/53)  BP(mean): --  RR: 16 (06 Dec 2020 06:00) (16 - 18)  SpO2: 95% (06 Dec 2020 06:00) (95% - 97%)      PHYSICAL EXAM:  GENERAL: Not in distress   CHEST/LUNG: Not using accessory muscles   HEART: s1 and s2 present  ABDOMEN:  Nontender and  Nondistended  EXTREMITIES: No pedal  edema  CNS: Awake and confused      LABS: No new Labs                        8.5    7.68  )-----------( 420      ( 05 Dec 2020 07:52 )             29.3                           9.4    5.91  )-----------( 505      ( 04 Dec 2020 06:26 )             31.1           152<H>  |  122<H>  |  31<H>  ----------------------------<  99  3.9   |  17<L>  |  1.39<H>    Ca    8.2<L>      05 Dec 2020 07:52    TPro  5.6<L>  /  Alb  1.7<L>  /  TBili  0.4  /  DBili  x   /  AST  41<H>  /  ALT  23  /  AlkPhos  88  -    148<H>  |  116<H>  |  24<H>  ----------------------------<  85  3.4<L>   |  24  |  0.86    Ca    8.5      04 Dec 2020 06:26    TPro  6.0  /  Alb  1.7<L>  /  TBili  0.3  /  DBili  x   /  AST  19  /  ALT  15  /  AlkPhos  94  12-03        Urinalysis Basic - ( 02 Dec 2020 10:37 )  Color: Yellow / Appearance: Slightly Turbid / S.015 / pH: x  Gluc: x / Ketone: Negative  / Bili: Negative / Urobili: 1   Blood: x / Protein: 100 / Nitrite: Positive   Leuk Esterase: Moderate / RBC: 0-2 /HPF / WBC >50 /HPF   Sq Epi: x / Non Sq Epi: x / Bacteria: Many /HPF        MEDICATIONS  (STANDING):    dextrose 5%. 1000 milliLiter(s) (75 mL/Hr) IV Continuous <Continuous>  heparin   Injectable 5000 Unit(s) SubCutaneous every 8 hours  ketorolac   Injectable 15 milliGRAM(s) IV Push once  meropenem  IVPB      meropenem  IVPB 1000 milliGRAM(s) IV Intermittent every 12 hours  tamsulosin 0.4 milliGRAM(s) Oral at bedtime        RADIOLOGY & ADDITIONAL TESTS:    20 : CT Abdomen and Pelvis w/ IV Cont (20 @ 19:02) Chronic urinary bladder outlet obstruction with additional evidence of cystitis. No definite evidence of ascending infection.      MICROBIOLOGY DATA:    Culture - Urine (20 @ 19:11)   - Ciprofloxacin: R 2   - Ciprofloxacin: R >2   - Ertapenem: S <=0.5   - Ertapenem: S <=0.5   - Meropenem: S <=1   - Meropenem: S <=1   - Nitrofurantoin: R >64 Should not be used to treat pyelonephritis   - Nitrofurantoin: S <=32 Should not be used to treat pyelonephritis   - Piperacillin/Tazobactam: S <=8   - Piperacillin/Tazobactam: S <=8   - Tigecycline: R <=2   - Tigecycline: S <=2   - Tobramycin: S <=2   - Tobramycin: S 4   - Trimethoprim/Sulfamethoxazole: S <=0.5/9.5   - Trimethoprim/Sulfamethoxazole: R >2/38   - Amikacin: S <=16   - Amikacin: S <=16   - Amoxicillin/Clavulanic Acid: R >16/8   - Amoxicillin/Clavulanic Acid: I 16/8   - Ampicillin: R >16 These ampicillin results predict results for amoxicillin   - Ampicillin: R >16 These ampicillin results predict results for amoxicillin   - Ampicillin/Sulbactam: S <=4/2 Enterobacter, Citrobacter, and Serratia may develop resistance during prolonged therapy (3-4 days)   - Ampicillin/Sulbactam: R >16/8 Enterobacter, Citrobacter, and Serratia may develop resistance during prolonged therapy (3-4 days)   - Aztreonam: R >16   - Aztreonam: S <=4   - Cefazolin: R >16   - Cefazolin: R >16 ([email or web address]) For uncomplicated UTI with K. pneumoniae, E. coli, or P. mirablis: RACQUEL <=16 is sensitive and RACQUEL >=32 is resistant. This also predicts results for oral agents cefaclor, cefdinir, cefpodoxime, cefprozil, cefuroxime axetil, cephalexin and locarbef for uncomplicated UTI. Note that some isolates may be susceptible to these agents while testing resistant to cefazolin.   - Cefepime: R >16   - Cefepime: S <=2   - Cefoxitin: S <=8   - Cefoxitin: I 16   - Ceftriaxone: R >32 Enterobacter, Citrobacter, and Serratia may develop resistance during prolonged therapy   - Ceftriaxone: S <=1 Enterobacter, Citrobacter, and Serratia may develop resistance during prolonged therapy   - Gentamicin: I 8   - Gentamicin: S <=2   - Imipenem: S <=1   - Imipenem: I 2   - Levofloxacin: I 1   - Levofloxacin: R >4   Specimen Source: .Urine Clean Catch (Midstream)   Culture Results:   >100,000 CFU/ml Morganella morganii   >100,000 CFU/ml Escherichia coli ESBL   Organism Identification: Escherichia coli ESBL   Morganella morganii   Organism: Escherichia coli ESBL   Organism: Morganella morganii   Method Type: RACQUEL   Culture - Urine (20 @ 19:11)   Specimen Source: .Urine Clean Catch (Midstream)   Culture Results:   >100,000 CFU/ml Gram Negative Rods   >100,000 CFU/ml Morganella morganii     COVID-19 PCR . (20 @ 13:32)   COVID-19 PCR: NotDetec:              Patient is seen and examined at the bed side, is afebrile. He appears more calm today.         REVIEW OF SYSTEMS: All other review systems are negative      ALLERGIES: No Known Allergies      Vital Signs Last 24 Hrs  T(C): 36.1 (06 Dec 2020 06:00), Max: 36.3 (05 Dec 2020 20:44)  T(F): 97 (06 Dec 2020 06:00), Max: 97.3 (05 Dec 2020 20:44)  HR: 82 (06 Dec 2020 06:00) (76 - 82)  BP: 104/53 (06 Dec 2020 06:00) (98/54 - 104/53)  BP(mean): --  RR: 16 (06 Dec 2020 06:00) (16 - 18)  SpO2: 95% (06 Dec 2020 06:00) (95% - 97%)      PHYSICAL EXAM:  GENERAL: Not in distress   CHEST/LUNG: Not using accessory muscles   HEART: s1 and s2 present  ABDOMEN:  Nontender and  Nondistended  EXTREMITIES: No pedal  edema  CNS: Awake and confused        LABS: No new Labs                        8.5    7.68  )-----------( 420      ( 05 Dec 2020 07:52 )             29.3                           9.4    5.91  )-----------( 505      ( 04 Dec 2020 06:26 )             31.1           152<H>  |  122<H>  |  31<H>  ----------------------------<  99  3.9   |  17<L>  |  1.39<H>    Ca    8.2<L>      05 Dec 2020 07:52    TPro  5.6<L>  /  Alb  1.7<L>  /  TBili  0.4  /  DBili  x   /  AST  41<H>  /  ALT  23  /  AlkPhos  88        12-04    148<H>  |  116<H>  |  24<H>  ----------------------------<  85  3.4<L>   |  24  |  0.86    Ca    8.5      04 Dec 2020 06:26    TPro  6.0  /  Alb  1.7<L>  /  TBili  0.3  /  DBili  x   /  AST  19  /  ALT  15  /  AlkPhos  94          Urinalysis Basic - ( 02 Dec 2020 10:37 )  Color: Yellow / Appearance: Slightly Turbid / S.015 / pH: x  Gluc: x / Ketone: Negative  / Bili: Negative / Urobili: 1   Blood: x / Protein: 100 / Nitrite: Positive   Leuk Esterase: Moderate / RBC: 0-2 /HPF / WBC >50 /HPF   Sq Epi: x / Non Sq Epi: x / Bacteria: Many /HPF        MEDICATIONS  (STANDING):    dextrose 5%. 1000 milliLiter(s) (75 mL/Hr) IV Continuous <Continuous>  heparin   Injectable 5000 Unit(s) SubCutaneous every 8 hours  ketorolac   Injectable 15 milliGRAM(s) IV Push once  meropenem  IVPB      meropenem  IVPB 1000 milliGRAM(s) IV Intermittent every 12 hours  tamsulosin 0.4 milliGRAM(s) Oral at bedtime        RADIOLOGY & ADDITIONAL TESTS:    20 : CT Abdomen and Pelvis w/ IV Cont (20 @ 19:02) Chronic urinary bladder outlet obstruction with additional evidence of cystitis. No definite evidence of ascending infection.      MICROBIOLOGY DATA:    Culture - Urine (20 @ 19:11)   - Ciprofloxacin: R 2   - Ciprofloxacin: R >2   - Ertapenem: S <=0.5   - Ertapenem: S <=0.5   - Meropenem: S <=1   - Meropenem: S <=1   - Nitrofurantoin: R >64 Should not be used to treat pyelonephritis   - Nitrofurantoin: S <=32 Should not be used to treat pyelonephritis   - Piperacillin/Tazobactam: S <=8   - Piperacillin/Tazobactam: S <=8   - Tigecycline: R <=2   - Tigecycline: S <=2   - Tobramycin: S <=2   - Tobramycin: S 4   - Trimethoprim/Sulfamethoxazole: S <=0.5/9.5   - Trimethoprim/Sulfamethoxazole: R >2/38   - Amikacin: S <=16   - Amikacin: S <=16   - Amoxicillin/Clavulanic Acid: R >16/8   - Amoxicillin/Clavulanic Acid: I 168   - Ampicillin: R >16 These ampicillin results predict results for amoxicillin   - Ampicillin: R >16 These ampicillin results predict results for amoxicillin   - Ampicillin/Sulbactam: S <=4/2 Enterobacter, Citrobacter, and Serratia may develop resistance during prolonged therapy (3-4 days)   - Ampicillin/Sulbactam: R >16/8 Enterobacter, Citrobacter, and Serratia may develop resistance during prolonged therapy (3-4 days)   - Aztreonam: R >16   - Aztreonam: S <=4   - Cefazolin: R >16   - Cefazolin: R >16 ([email or web address]) For uncomplicated UTI with K. pneumoniae, E. coli, or P. mirablis: RACQUEL <=16 is sensitive and RACQUEL >=32 is resistant. This also predicts results for oral agents cefaclor, cefdinir, cefpodoxime, cefprozil, cefuroxime axetil, cephalexin and locarbef for uncomplicated UTI. Note that some isolates may be susceptible to these agents while testing resistant to cefazolin.   - Cefepime: R >16   - Cefepime: S <=2   - Cefoxitin: S <=8   - Cefoxitin: I 16   - Ceftriaxone: R >32 Enterobacter, Citrobacter, and Serratia may develop resistance during prolonged therapy   - Ceftriaxone: S <=1 Enterobacter, Citrobacter, and Serratia may develop resistance during prolonged therapy   - Gentamicin: I 8   - Gentamicin: S <=2   - Imipenem: S <=1   - Imipenem: I 2   - Levofloxacin: I 1   - Levofloxacin: R >4   Specimen Source: .Urine Clean Catch (Midstream)   Culture Results:   >100,000 CFU/ml Morganella morganii   >100,000 CFU/ml Escherichia coli ESBL   Organism Identification: Escherichia coli ESBL   Morganella morganii   Organism: Escherichia coli ESBL   Organism: Morganella morganii   Method Type: RACQUEL   Culture - Urine (20 @ 19:11)   Specimen Source: .Urine Clean Catch (Midstream)   Culture Results:   >100,000 CFU/ml Gram Negative Rods   >100,000 CFU/ml Morganella morganii     COVID-19 PCR . (20 @ 13:32)   COVID-19 PCR: NotDetec:

## 2020-12-07 NOTE — PROGRESS NOTE ADULT - SUBJECTIVE AND OBJECTIVE BOX
OVERNIGHT EVENTS: more hypoxic today, lethargic, inc O2 requirements    Present Symptoms:      Review of Systems:   Unable to obtain due to poor mentation     MEDICATIONS  (STANDING):  meropenem  IVPB      meropenem  IVPB 1000 milliGRAM(s) IV Intermittent every 12 hours  petrolatum white Ointment 1 Application(s) Topical three times a day    MEDICATIONS  (PRN):  glycopyrrolate Injectable 0.4 milliGRAM(s) IV Push every 6 hours PRN secretions  HYDROmorphone  Injectable 0.5 milliGRAM(s) IV Push every 3 hours PRN dyspnea or severe pain      PHYSICAL EXAM:  Vital Signs Last 24 Hrs  T(C): 36.1 (07 Dec 2020 05:33), Max: 36.2 (06 Dec 2020 15:15)  T(F): 97 (07 Dec 2020 05:33), Max: 97.2 (06 Dec 2020 15:15)  HR: 58 (07 Dec 2020 05:33) (58 - 90)  BP: 95/70 (07 Dec 2020 05:33) (95/70 - 144/76)  BP(mean): --  RR: 16 (07 Dec 2020 05:33) (15 - 16)  SpO2: 74% (07 Dec 2020 05:33) (74% - 94%)     Karnofsky Performance Score/Palliative Performance Status Version2:    %     GENERAL: lethargic, cachectic, ill appearing,  NAD  HEENT: Atraumatic, oropharynx clear, neck supple  CHEST/LUNG: unlabored  HEART: Regular rate and rhythm    ABDOMEN: Soft, Nontender, Nondistended   MUSCULOSKELETAL:  No  edema,  bedbound  NERVOUS SYSTEM:  lethargic, min responsive,  does not follow commands  SKIN: No rashes or lesions noted  Oral intake: poor    LABS:                RADIOLOGY & ADDITIONAL STUDIES:    ADVANCE DIRECTIVES: MOLST for DNR/DNI.     OVERNIGHT EVENTS: more hypoxic today, lethargic, inc O2 requirements    Present Symptoms:      Review of Systems:   Unable to obtain due to poor mentation     MEDICATIONS  (STANDING):  meropenem  IVPB      meropenem  IVPB 1000 milliGRAM(s) IV Intermittent every 12 hours  petrolatum white Ointment 1 Application(s) Topical three times a day    MEDICATIONS  (PRN):  glycopyrrolate Injectable 0.4 milliGRAM(s) IV Push every 6 hours PRN secretions  HYDROmorphone  Injectable 0.5 milliGRAM(s) IV Push every 3 hours PRN dyspnea or severe pain      PHYSICAL EXAM:  Vital Signs Last 24 Hrs  T(C): 36.1 (07 Dec 2020 05:33), Max: 36.2 (06 Dec 2020 15:15)  T(F): 97 (07 Dec 2020 05:33), Max: 97.2 (06 Dec 2020 15:15)  HR: 58 (07 Dec 2020 05:33) (58 - 90)  BP: 95/70 (07 Dec 2020 05:33) (95/70 - 144/76)  BP(mean): --  RR: 16 (07 Dec 2020 05:33) (15 - 16)  SpO2: 74% (07 Dec 2020 05:33) (74% - 94%)     Karnofsky Performance Score/Palliative Performance Status Version2:   10 %     GENERAL: lethargic, cachectic, ill appearing,  NAD  HEENT: Atraumatic, oropharynx clear, neck supple  CHEST/LUNG: unlabored  HEART: Regular rate and rhythm    ABDOMEN: Soft, Nontender, Nondistended   MUSCULOSKELETAL:  No  edema,  bedbound  NERVOUS SYSTEM:  lethargic, min responsive,  does not follow commands  SKIN: No rashes or lesions noted  Oral intake: poor    LABS:                RADIOLOGY & ADDITIONAL STUDIES:    ADVANCE DIRECTIVES: MOLST for DNR/DNI.

## 2020-12-07 NOTE — PROGRESS NOTE ADULT - PROBLEM SELECTOR PLAN 1
-sent by [facility] for penile discharge, grossly positive UA with elevated Lactate, likely in setting of Cystitis   -cont Meropenem given hx of recurrent UTIs   -CT abd and pelvis as above   -urine culture with Morganella Morganii  -f/u urine culture sensitivities   -Urology following   -ID Dr. Duffy
-sent by [facility] for penile discharge, grossly positive UA with elevated Lactate, likely in setting of Cystitis   -cont Meropenem given hx of recurrent UTIs   -CT abd and pelvis as above   -f/u urine culture   -Urology followed- no acute interventions recommended   -ID Dr. Duffy
advanced, bedbound, FAST 7c min. Hospice eligible. DNR/DNI. Family in agreement for hospice.    on antibiotics for sepsis, UTI. Currently appears to be actively dying, family in agreement for comfort measures only.

## 2020-12-07 NOTE — PROGRESS NOTE ADULT - REASON FOR ADMISSION
Complicated UTI

## 2020-12-07 NOTE — PROGRESS NOTE ADULT - PROBLEM SELECTOR PLAN 4
Patient clinically declining, more hypoxic, appears to be actively dying, prognosis likely hours to days. Discussed w daughter Swapna, verbalized understanding, support provided. She is in agreement for comfort measures only, discharge on hold. DNR/DNI.

## 2020-12-07 NOTE — PROGRESS NOTE ADULT - NUTRITIONAL ASSESSMENT
This patient has been assessed with a concern for Malnutrition and has been determined to have a diagnosis/diagnoses of Severe protein-calorie malnutrition and Underweight/BMI < 19.    This patient is being managed with:   Diet Pureed-  Supplement Feeding Modality:  Oral  Ensure Enlive Cans or Servings Per Day:  1       Frequency:  Three Times a day  Entered: Dec  5 2020 10:53AM    Diet Pureed-  Entered: Dec  2 2020  3:47PM    The following pending diet order is being considered for treatment of Severe protein-calorie malnutrition and Underweight/BMI < 19:null
This patient has been assessed with a concern for Malnutrition and has been determined to have a diagnosis/diagnoses of Severe protein-calorie malnutrition and Underweight/BMI < 19.    This patient is being managed with:   Diet Pureed-  Supplement Feeding Modality:  Oral  Ensure Enlive Cans or Servings Per Day:  1       Frequency:  Three Times a day  Entered: Dec  6 2020  7:16PM    Diet Pureed-  Supplement Feeding Modality:  Oral  Ensure Enlive Cans or Servings Per Day:  1       Frequency:  Three Times a day  Entered: Dec  5 2020 10:53AM    The following pending diet order is being considered for treatment of Severe protein-calorie malnutrition and Underweight/BMI < 19:null
This patient has been assessed with a concern for Malnutrition and has been determined to have a diagnosis/diagnoses of Severe protein-calorie malnutrition and Underweight/BMI < 19.    This patient is being managed with:   Diet Pureed-  Supplement Feeding Modality:  Oral  Ensure Enlive Cans or Servings Per Day:  1       Frequency:  Three Times a day  Entered: Dec  6 2020  7:16PM    Diet Pureed-  Supplement Feeding Modality:  Oral  Ensure Enlive Cans or Servings Per Day:  1       Frequency:  Three Times a day  Entered: Dec  5 2020 10:53AM    The following pending diet order is being considered for treatment of Severe protein-calorie malnutrition and Underweight/BMI < 19:null
This patient has been assessed with a concern for Malnutrition and has been determined to have a diagnosis/diagnoses of Severe protein-calorie malnutrition and Underweight/BMI < 19.    This patient is being managed with:   Diet Pureed-  Supplement Feeding Modality:  Oral  Ensure Enlive Cans or Servings Per Day:  1       Frequency:  Three Times a day  Entered: Dec  5 2020 10:53AM    Diet Pureed-  Entered: Dec  2 2020  3:47PM    The following pending diet order is being considered for treatment of Severe protein-calorie malnutrition and Underweight/BMI < 19:null
This patient has been assessed with a concern for Malnutrition and has been determined to have a diagnosis/diagnoses of Severe protein-calorie malnutrition and Underweight/BMI < 19.    This patient is being managed with:   Diet Pureed-  Supplement Feeding Modality:  Oral  Ensure Enlive Cans or Servings Per Day:  1       Frequency:  Three Times a day  Entered: Dec  5 2020 10:53AM    Diet Pureed-  Entered: Dec  2 2020  3:47PM    The following pending diet order is being considered for treatment of Severe protein-calorie malnutrition and Underweight/BMI < 19:null

## 2020-12-07 NOTE — HOSPICE CARE NOTE - CONVESATION DETAILS
Friday 12/4/20: [facility] plan of care discussed with pt's daughter Swapna [phone] and [facility] consents emailed to her at [email or web address] and is pending receipt. Pt was approved for home hospice services and will need DME including bed, flor, WC and prn o2 ordered prior to discharge. Pt also has 24/7 medicaid aides that will need to be reinstated prior to discharge as the aides are whom he lives with per dtr Swapna. Rupert Brannon, [facility] RN.
Monday 12/7: [facility] consents received and patient was approved for home hospice services but this RN was notified by PCT Dr Alberto that pt will remain in [facility] due to pt's status declined and pt is actively dying and will stay at [facility] for comfort measures. Rupert Brannon RN.

## 2020-12-07 NOTE — PROGRESS NOTE ADULT - NSPROBSELLAUNCH_GEN
<<-----Click on this checkbox to launch Problem Selector
<<-----Click on this checkbox to launch Problem Selector

## 2020-12-07 NOTE — DISCHARGE NOTE FOR THE EXPIRED PATIENT - HOSPITAL COURSE
89 y/o M pt from home bedbound have [facility]HA 24/7 with a PMHx of Dementia, axo x0 at baseline sent to ED for reported greenish penile discharge noticed  by HHA. Pt was found to have elevated Lactate and positive UA, admitted to medicine for complicated UTI, started on Meropenem. ID Dr. Duffy consulted. CT of abdomen and pelvis was done and showed Chronic urinary bladder outlet obstruction with additional evidence of cystitis. No definite evidence of ascending infection. Urology followed- no acute interventions recommended

## 2020-12-07 NOTE — PROGRESS NOTE ADULT - PROBLEM SELECTOR PLAN 2
-not on any meds at home axo x 0 at baseline, mental status at baseline   -supportive care
-not on any meds at home axo x 0 at baseline, mental status at baseline   -supportive care
2/2 comorbidities, comfort feeds as tolerated, family does not want feeding tubes. Albumin 1.7. High risk for skin failure.

## 2020-12-07 NOTE — CHART NOTE - NSCHARTNOTEFT_GEN_A_CORE
Death Note: Called to see the patient for unresponsiveness.     On exam:  Physical exam showed patient in bed, unresponsive to verbal or noxious stimuli.  Pupils are fixed and dilated.  No spontaneous respirations.  Skin pale. Absent heart and breath sounds.  No palpable carotid and radial pulses.    Absent peripheral pulses.  Patient pronounced dead at 13:55  Dr England  notified .   Family notified Swapna Foy  Autopsy declined.    Meg Molina  Dept of Medicine

## 2020-12-07 NOTE — PROGRESS NOTE ADULT - SUBJECTIVE AND OBJECTIVE BOX
Patient is a 89y old  Male who presents with a chief complaint of Complicated UTI (06 Dec 2020 15:10)    pt seen in icu [  ], reg med/surg floor [ x  ], bed [ x ], chair at bedside [   ], awake and responsive [ x ], lethargic [  ],    nad [ x ]      Allergies    No Known Allergies        Vitals    T(F): 97 (12-07-20 @ 05:33), Max: 97.2 (12-06-20 @ 15:15)  HR: 58 (12-07-20 @ 05:33) (58 - 90)  BP: 95/70 (12-07-20 @ 05:33) (95/70 - 144/76)  RR: 16 (12-07-20 @ 05:33) (15 - 16)  SpO2: 74% (12-07-20 @ 05:33) (74% - 94%)  Wt(kg): --  CAPILLARY BLOOD GLUCOSE          Labs                          8.5    7.68  )-----------( 420      ( 05 Dec 2020 07:52 )             29.3       12-05    152<H>  |  122<H>  |  31<H>  ----------------------------<  99  3.9   |  17<L>  |  1.39<H>    Ca    8.2<L>      05 Dec 2020 07:52    TPro  5.6<L>  /  Alb  1.7<L>  /  TBili  0.4  /  DBili  x   /  AST  41<H>  /  ALT  23  /  AlkPhos  88  12-05            .Urine Clean Catch (Midstream)  12-02 @ 19:11   >100,000 CFU/ml Morganella morganii  >100,000 CFU/ml Escherichia coli ESBL  --  Escherichia coli ESBL  Morganella morganii          Radiology Results      Meds    MEDICATIONS  (STANDING):  dextrose 5%. 1000 milliLiter(s) (75 mL/Hr) IV Continuous <Continuous>  heparin   Injectable 5000 Unit(s) SubCutaneous every 8 hours  ketorolac   Injectable 15 milliGRAM(s) IV Push once  meropenem  IVPB      meropenem  IVPB 1000 milliGRAM(s) IV Intermittent every 12 hours  tamsulosin 0.4 milliGRAM(s) Oral at bedtime      MEDICATIONS  (PRN):      Physical Exam    Neuro :  no focal deficits  Respiratory: CTA B/L  CV: RRR, S1S2, no murmurs,   Abdominal: Soft, NT, ND +BS,  Extremities: No edema, + peripheral pulses    ASSESSMENT    Urinary tract infection   sepsis   cystitis  chronic bladder outlet obstruction  h/o Blind right eye  Dementia          PLAN    ucx and sens with esbl e coli and morganella noted above   cont merem   id f/u  urology f/u   pt incontinent/voiding  cont to monitor for UO   hobbs cath if retaining  cont flomax  as of goc conversation 8/30/2018 :pt wants his daughter Swapna to speak for him, as surrogate.   palliative eval noted  DNR Order; Comfort measures only; Do not re-hospitalize; No artificial nutrition  DNI  Do not send to hospital unless pain or severe symptoms cannot be otherwise controlled  cont current meds  d/c plan for home hospice pending acceptance and dme      Assessment and Plan:   Nutritional Assessment:  · Nutritional Assessment  This patient has been assessed with a concern for Malnutrition and has been determined to have a diagnosis/diagnoses of Severe protein-calorie malnutrition and Underweight/BMI < 19.    This patient is being managed with:   Diet Pureed-  Supplement Feeding Modality:  Oral  Ensure Enlive Cans or Servings Per Day:  1       Frequency:  Three Times a day  Entered: Dec  5 2020 10:53AM    Diet Pureed-  Entered: Dec  2 2020  3:47PM    The following pending diet order is being considered for treatment of Severe protein-calorie malnutrition and Underweight/BMI < 19:null             Patient is a 89y old  Male who presents with a chief complaint of Complicated UTI (06 Dec 2020 15:10)    pt seen in icu [  ], reg med/surg floor [ x  ], bed [ x ], chair at bedside [   ], awake and responsive [ x ], lethargic [  ],    nad [ x ]      Allergies    No Known Allergies        Vitals    T(F): 97 (12-07-20 @ 05:33), Max: 97.2 (12-06-20 @ 15:15)  HR: 58 (12-07-20 @ 05:33) (58 - 90)  BP: 95/70 (12-07-20 @ 05:33) (95/70 - 144/76)  RR: 16 (12-07-20 @ 05:33) (15 - 16)  SpO2: 74% (12-07-20 @ 05:33) (74% - 94%)  Wt(kg): --  CAPILLARY BLOOD GLUCOSE          Labs                          8.5    7.68  )-----------( 420      ( 05 Dec 2020 07:52 )             29.3       12-05    152<H>  |  122<H>  |  31<H>  ----------------------------<  99  3.9   |  17<L>  |  1.39<H>    Ca    8.2<L>      05 Dec 2020 07:52    TPro  5.6<L>  /  Alb  1.7<L>  /  TBili  0.4  /  DBili  x   /  AST  41<H>  /  ALT  23  /  AlkPhos  88  12-05            .Urine Clean Catch (Midstream)  12-02 @ 19:11   >100,000 CFU/ml Morganella morganii  >100,000 CFU/ml Escherichia coli ESBL  --  Escherichia coli ESBL  Morganella morganii          Radiology Results      Meds    MEDICATIONS  (STANDING):  dextrose 5%. 1000 milliLiter(s) (75 mL/Hr) IV Continuous <Continuous>  heparin   Injectable 5000 Unit(s) SubCutaneous every 8 hours  ketorolac   Injectable 15 milliGRAM(s) IV Push once  meropenem  IVPB      meropenem  IVPB 1000 milliGRAM(s) IV Intermittent every 12 hours  tamsulosin 0.4 milliGRAM(s) Oral at bedtime      MEDICATIONS  (PRN):      Physical Exam    Neuro :  no focal deficits  Respiratory: CTA B/L  CV: RRR, S1S2, no murmurs,   Abdominal: Soft, NT, ND +BS,  Extremities: No edema, + peripheral pulses    ASSESSMENT    Urinary tract infection   sepsis   cystitis  chronic bladder outlet obstruction  h/o Blind right eye  Dementia          PLAN    ucx and sens with esbl e coli and morganella noted above   cont merem until 12/9/20  id f/u  urology f/u   pt incontinent/voiding  cont to monitor for UO   hobbs cath if retaining  cont flomax  as of goc conversation 8/30/2018 :pt wants his daughter Swapna to speak for him, as surrogate.   palliative eval noted  DNR Order; Comfort measures only; Do not re-hospitalize; No artificial nutrition  DNI  Do not send to hospital unless pain or severe symptoms cannot be otherwise controlled  cont current meds  d/c plan for home hospice pending acceptance and dme      Assessment and Plan:   Nutritional Assessment:  · Nutritional Assessment	This patient has been assessed with a concern for Malnutrition and has been determined to have a diagnosis/diagnoses of Severe protein-calorie malnutrition and Underweight/BMI < 19.    This patient is being managed with:   Diet Pureed-  Supplement Feeding Modality:  Oral  Ensure Enlive Cans or Servings Per Day:  1       Frequency:  Three Times a day  Entered: Dec  5 2020 10:53AM    Diet Pureed-  Entered: Dec  2 2020  3:47PM    The following pending diet order is being considered for treatment of Severe protein-calorie malnutrition and Underweight/BMI < 19:null

## 2020-12-10 DIAGNOSIS — E43 UNSPECIFIED SEVERE PROTEIN-CALORIE MALNUTRITION: ICD-10-CM

## 2020-12-10 DIAGNOSIS — Z71.89 OTHER SPECIFIED COUNSELING: ICD-10-CM

## 2020-12-10 DIAGNOSIS — N39.0 URINARY TRACT INFECTION, SITE NOT SPECIFIED: ICD-10-CM

## 2020-12-10 DIAGNOSIS — Z02.9 ENCOUNTER FOR ADMINISTRATIVE EXAMINATIONS, UNSPECIFIED: ICD-10-CM

## 2020-12-10 DIAGNOSIS — R53.2 FUNCTIONAL QUADRIPLEGIA: ICD-10-CM

## 2020-12-10 DIAGNOSIS — Z51.5 ENCOUNTER FOR PALLIATIVE CARE: ICD-10-CM

## 2020-12-28 PROCEDURE — 99285 EMERGENCY DEPT VISIT HI MDM: CPT

## 2020-12-28 PROCEDURE — 80048 BASIC METABOLIC PNL TOTAL CA: CPT

## 2020-12-28 PROCEDURE — 86769 SARS-COV-2 COVID-19 ANTIBODY: CPT

## 2020-12-28 PROCEDURE — 74177 CT ABD & PELVIS W/CONTRAST: CPT

## 2020-12-28 PROCEDURE — 96374 THER/PROPH/DIAG INJ IV PUSH: CPT

## 2020-12-28 PROCEDURE — 85025 COMPLETE CBC W/AUTO DIFF WBC: CPT

## 2020-12-28 PROCEDURE — 85027 COMPLETE CBC AUTOMATED: CPT

## 2020-12-28 PROCEDURE — 80053 COMPREHEN METABOLIC PANEL: CPT

## 2020-12-28 PROCEDURE — 87086 URINE CULTURE/COLONY COUNT: CPT

## 2020-12-28 PROCEDURE — 93005 ELECTROCARDIOGRAM TRACING: CPT

## 2020-12-28 PROCEDURE — 87186 SC STD MICRODIL/AGAR DIL: CPT

## 2020-12-28 PROCEDURE — 81001 URINALYSIS AUTO W/SCOPE: CPT

## 2020-12-28 PROCEDURE — 87635 SARS-COV-2 COVID-19 AMP PRB: CPT

## 2020-12-28 PROCEDURE — 83605 ASSAY OF LACTIC ACID: CPT

## 2020-12-28 PROCEDURE — 36415 COLL VENOUS BLD VENIPUNCTURE: CPT

## 2022-09-25 NOTE — ED ADULT TRIAGE NOTE - TEMPERATURE IN FAHRENHEIT (DEGREES F)
Chief complaint:  Cough    HPI history provided by report and patient  Patient sent in for possible aspiration. He admits to choking lately and trouble swallowing and has been coughing. Denies chest pain or shortness of breath. Denies abdominal pain. He is awake and alert. Has no other particular complaints at this time. Denies abdominal pain or vomiting or diarrhea. No back pain. No headache. No specific treatment prior to arrival.  Nothing really makes it better or worse. Review of Systems   Constitutional:  Negative for chills, fatigue and fever. HENT:  Positive for trouble swallowing. Negative for congestion, rhinorrhea and sore throat. Respiratory:  Positive for cough, choking and wheezing. Negative for chest tightness and shortness of breath. Cardiovascular:  Negative for chest pain and palpitations. Gastrointestinal:  Negative for abdominal pain, nausea and vomiting. Genitourinary:  Negative for flank pain and hematuria. Musculoskeletal:  Negative for back pain and neck pain. Skin:  Negative for rash and wound. Neurological:  Negative for dizziness, syncope, weakness and headaches. Physical Exam  Vitals and nursing note reviewed. Constitutional:       General: He is awake. He is not in acute distress. Appearance: He is well-developed. He is not ill-appearing, toxic-appearing or diaphoretic. HENT:      Head: Normocephalic and atraumatic. Eyes:      General: No scleral icterus. Pupils: Pupils are equal, round, and reactive to light. Cardiovascular:      Rate and Rhythm: Normal rate and regular rhythm. Heart sounds: Normal heart sounds. No murmur heard. Pulmonary:      Effort: Pulmonary effort is normal. No respiratory distress. Breath sounds: No stridor, decreased air movement or transmitted upper airway sounds. Rhonchi present. No decreased breath sounds, wheezing or rales.       Comments: Scattered mild coarse bilateral breath sounds with no (7/17/2015). Social History:  reports that he quit smoking about 12 years ago. He has quit using smokeless tobacco. He reports that he does not drink alcohol and does not use drugs. Family History: family history includes Heart Disease in his mother; Heart Failure in his father and mother. The patients home medications have been reviewed.     Allergies: Elemental sulfur and Penicillins    -------------------------------------------------- RESULTS -------------------------------------------------  Labs:  Results for orders placed or performed during the hospital encounter of 09/25/22   COVID-19, Rapid    Specimen: Nasopharyngeal Swab   Result Value Ref Range    SARS-CoV-2, NAAT Not Detected Not Detected   Lactate, Sepsis   Result Value Ref Range    Lactic Acid, Sepsis 0.9 0.5 - 1.9 mmol/L   CBC with Auto Differential   Result Value Ref Range    WBC 8.9 4.5 - 11.5 E9/L    RBC 3.94 3.80 - 5.80 E12/L    Hemoglobin 9.9 (L) 12.5 - 16.5 g/dL    Hematocrit 32.1 (L) 37.0 - 54.0 %    MCV 81.5 80.0 - 99.9 fL    MCH 25.1 (L) 26.0 - 35.0 pg    MCHC 30.8 (L) 32.0 - 34.5 %    RDW 16.8 (H) 11.5 - 15.0 fL    Platelets 675 006 - 123 E9/L    MPV 11.9 7.0 - 12.0 fL    Neutrophils % 69.8 43.0 - 80.0 %    Lymphocytes % 8.6 (L) 20.0 - 42.0 %    Monocytes % 17.2 (H) 2.0 - 12.0 %    Eosinophils % 2.6 0.0 - 6.0 %    Basophils % 1.7 0.0 - 2.0 %    Neutrophils Absolute 6.23 1.80 - 7.30 E9/L    Lymphocytes Absolute 0.80 (L) 1.50 - 4.00 E9/L    Monocytes Absolute 1.51 (H) 0.10 - 0.95 E9/L    Eosinophils Absolute 0.23 0.05 - 0.50 E9/L    Basophils Absolute 0.15 0.00 - 0.20 E9/L    Anisocytosis 1+     Polychromasia 1+     Hypochromia 1+     Poikilocytes 1+     Ovalocytes 1+     Target Cells 1+     Basophilic Stippling 1+    Basic Metabolic Panel   Result Value Ref Range    Sodium 134 132 - 146 mmol/L    Potassium 4.8 3.5 - 5.0 mmol/L    Chloride 99 98 - 107 mmol/L    CO2 28 22 - 29 mmol/L    Anion Gap 7 7 - 16 mmol/L    Glucose 89 74 - the patient is without objective evidence of an acute process requiring hospitalization or inpatient management. They have remained hemodynamically stable throughout their entire ED visit and are stable for discharge with outpatient follow-up. The plan has been discussed in detail and they are aware of the specific conditions for emergent return, as well as the importance of follow-up. New Prescriptions    DOXYCYCLINE HYCLATE (VIBRA-TABS) 100 MG TABLET    Take 1 tablet by mouth 2 times daily for 10 days    METRONIDAZOLE (FLAGYL) 500 MG TABLET    Take 1 tablet by mouth in the morning and 1 tablet in the evening. Do all this for 10 days. Diagnosis:  1. Pneumonia due to infectious organism, unspecified laterality, unspecified part of lung        Disposition:  Patient's disposition: Discharge to nursing home  Patient's condition is stable.          Dylon Barrera,   09/25/22 2031 98.4

## 2023-03-16 NOTE — ED PROVIDER NOTE - MEDICAL DECISION MAKING DETAILS
labs, cardiac work up, ekg, ct head/c-spine, Finasteride Counseling:  I discussed with the patient the risks of use of finasteride including but not limited to decreased libido, decreased ejaculate volume, gynecomastia, and depression. Women should not handle medication.  All of the patient's questions and concerns were addressed. Finasteride Male Counseling: Finasteride Counseling:  I discussed with the patient the risks of use of finasteride including but not limited to decreased libido, decreased ejaculate volume, gynecomastia, and depression. Women should not handle medication.  All of the patient's questions and concerns were addressed.

## 2023-04-20 NOTE — DISCHARGE NOTE PROVIDER - NSDCCONDITION_GEN_ALL_CORE
Stable Glycopyrrolate Pregnancy And Lactation Text: This medication is Pregnancy Category B and is considered safe during pregnancy. It is unknown if it is excreted breast milk.

## 2024-10-22 NOTE — ED PROVIDER NOTE - NS ED MD EM SELECTION
How Severe Is Your Skin Lesion?: moderate Have Your Skin Lesions Been Treated?: not been treated Is This A New Presentation, Or A Follow-Up?: Skin Lesions [id number] Comprehensive
